# Patient Record
Sex: MALE | Race: OTHER | HISPANIC OR LATINO | Employment: FULL TIME | URBAN - METROPOLITAN AREA
[De-identification: names, ages, dates, MRNs, and addresses within clinical notes are randomized per-mention and may not be internally consistent; named-entity substitution may affect disease eponyms.]

---

## 2017-08-07 ENCOUNTER — APPOINTMENT (EMERGENCY)
Dept: RADIOLOGY | Facility: HOSPITAL | Age: 37
End: 2017-08-07
Payer: COMMERCIAL

## 2017-08-07 ENCOUNTER — HOSPITAL ENCOUNTER (EMERGENCY)
Facility: HOSPITAL | Age: 37
Discharge: HOME | End: 2017-08-07
Admitting: EMERGENCY MEDICINE
Payer: COMMERCIAL

## 2017-08-07 VITALS
HEART RATE: 88 BPM | WEIGHT: 225 LBS | HEIGHT: 70 IN | BODY MASS INDEX: 32.21 KG/M2 | DIASTOLIC BLOOD PRESSURE: 83 MMHG | TEMPERATURE: 98.2 F | SYSTOLIC BLOOD PRESSURE: 151 MMHG | OXYGEN SATURATION: 98 % | RESPIRATION RATE: 18 BRPM

## 2017-08-07 DIAGNOSIS — M54.50 ACUTE LOW BACK PAIN WITHOUT SCIATICA, UNSPECIFIED BACK PAIN LATERALITY: Primary | ICD-10-CM

## 2017-08-07 PROCEDURE — 72100 X-RAY EXAM L-S SPINE 2/3 VWS: CPT

## 2017-08-07 PROCEDURE — 99283 EMERGENCY DEPT VISIT LOW MDM: CPT

## 2017-08-07 PROCEDURE — 96372 THER/PROPH/DIAG INJ SC/IM: CPT

## 2017-08-07 RX ORDER — KETOROLAC TROMETHAMINE 30 MG/ML
15 INJECTION, SOLUTION INTRAMUSCULAR; INTRAVENOUS ONCE
Status: COMPLETED | OUTPATIENT
Start: 2017-08-07 | End: 2017-08-07

## 2017-08-07 RX ORDER — DIAZEPAM 5 MG/1
5 TABLET ORAL ONCE
Status: COMPLETED | OUTPATIENT
Start: 2017-08-07 | End: 2017-08-07

## 2017-08-07 RX ORDER — NAPROXEN 500 MG/1
500 TABLET ORAL 2 TIMES DAILY WITH MEALS
Qty: 30 TABLET | Refills: 0 | Status: SHIPPED | OUTPATIENT
Start: 2017-08-07 | End: 2018-04-22

## 2017-08-07 RX ORDER — CYCLOBENZAPRINE HCL 10 MG
10 TABLET ORAL 3 TIMES DAILY PRN
Qty: 15 TABLET | Refills: 0 | Status: SHIPPED | OUTPATIENT
Start: 2017-08-07 | End: 2018-04-22

## 2017-08-07 RX ADMIN — DIAZEPAM 5 MG: 5 TABLET ORAL at 16:06

## 2017-08-07 RX ADMIN — KETOROLAC TROMETHAMINE 15 MG: 30 INJECTION, SOLUTION INTRAMUSCULAR at 14:40

## 2018-01-18 ENCOUNTER — HOSPITAL ENCOUNTER (EMERGENCY)
Facility: HOSPITAL | Age: 38
Discharge: HOME | End: 2018-01-18
Payer: COMMERCIAL

## 2018-01-18 VITALS
TEMPERATURE: 98.5 F | RESPIRATION RATE: 18 BRPM | HEART RATE: 76 BPM | SYSTOLIC BLOOD PRESSURE: 143 MMHG | DIASTOLIC BLOOD PRESSURE: 75 MMHG | OXYGEN SATURATION: 98 %

## 2018-01-18 DIAGNOSIS — J06.9 VIRAL URI: Primary | ICD-10-CM

## 2018-01-18 PROCEDURE — 99283 EMERGENCY DEPT VISIT LOW MDM: CPT

## 2018-01-18 RX ORDER — ALBUTEROL SULFATE 90 UG/1
2 AEROSOL, METERED RESPIRATORY (INHALATION) EVERY 6 HOURS PRN
Qty: 6.7 G | Refills: 0 | Status: SHIPPED | OUTPATIENT
Start: 2018-01-18

## 2018-01-18 RX ORDER — GUAIFENESIN 600 MG
1200 TABLET, EXTENDED RELEASE 12 HR ORAL EVERY 12 HOURS SCHEDULED
Qty: 20 TABLET | Refills: 0 | Status: SHIPPED | OUTPATIENT
Start: 2018-01-18 | End: 2018-04-22

## 2018-01-18 RX ORDER — FLUTICASONE PROPIONATE 50 MCG
1 SPRAY, SUSPENSION (ML) NASAL DAILY
Qty: 16 G | Refills: 0 | Status: SHIPPED | OUTPATIENT
Start: 2018-01-18 | End: 2018-04-22

## 2018-01-18 NOTE — ED PROVIDER NOTES
History  Chief Complaint   Patient presents with    Cough     Patient reports productive cough, nasal congestion ongoing for two days  Subjective fevers  44year old male presents today complaining of body aches, nasal congestion and cough for the past 2 days  Denies fevers, sore throat, abd pain, nausea, vomiting, diarrhea  Reports PMH asthma but denies chest tightness or shortness of breath  + sick contacts (roommate sick with similar symptoms)  Has not tried any OTC meds  Prior to Admission Medications   Prescriptions Last Dose Informant Patient Reported? Taking? cyclobenzaprine (FLEXERIL) 10 mg tablet   No No   Sig: Take 1 tablet by mouth 3 (three) times a day as needed for muscle spasms   naproxen (EC NAPROSYN) 500 MG EC tablet   No No   Sig: Take 1 tablet by mouth 2 (two) times a day with meals      Facility-Administered Medications: None       Past Medical History:   Diagnosis Date    Asthma        Past Surgical History:   Procedure Laterality Date    LUMBAR PUNCTURE         History reviewed  No pertinent family history  I have reviewed and agree with the history as documented  Social History   Substance Use Topics    Smoking status: Current Every Day Smoker     Packs/day: 0 50     Types: Cigarettes    Smokeless tobacco: Never Used    Alcohol use No        Review of Systems   HENT: Positive for congestion  Respiratory: Positive for cough  Musculoskeletal: Positive for myalgias  All other systems reviewed and are negative        Physical Exam  ED Triage Vitals [01/18/18 1250]   Temperature Pulse Respirations Blood Pressure SpO2   98 5 °F (36 9 °C) 76 18 143/75 98 %      Temp Source Heart Rate Source Patient Position - Orthostatic VS BP Location FiO2 (%)   Temporal Monitor Sitting Right arm --      Pain Score       No Pain           Orthostatic Vital Signs  Vitals:    01/18/18 1250   BP: 143/75   Pulse: 76   Patient Position - Orthostatic VS: Sitting       Physical Exam Constitutional: He appears well-developed and well-nourished  No distress  HENT:   Head: Normocephalic and atraumatic  Mouth/Throat: Oropharynx is clear and moist  No oropharyngeal exudate  Eyes: Conjunctivae and EOM are normal  Pupils are equal, round, and reactive to light  Neck: Normal range of motion  Neck supple  Cardiovascular: Normal rate and regular rhythm  No murmur heard  Pulmonary/Chest: Effort normal and breath sounds normal  No respiratory distress  He has no wheezes  Abdominal: Soft  Musculoskeletal: Normal range of motion  Lymphadenopathy:     He has no cervical adenopathy  Neurological: He is alert  Skin: Skin is warm and dry  Capillary refill takes less than 2 seconds  He is not diaphoretic  Psychiatric: He has a normal mood and affect  ED Medications  Medications - No data to display    Diagnostic Studies  Results Reviewed     None                 No orders to display              Procedures  Procedures       Phone Contacts  ED Phone Contact    ED Course  ED Course                                MDM  Number of Diagnoses or Management Options  Viral URI:   Diagnosis management comments: Symptoms consistent with viral URI  Will treat symptomatically and have patient follow-[facility] with [facility]  CritCare Time    Disposition  Final diagnoses:   Viral URI     Time reflects when diagnosis was documented in both MDM as applicable and the Disposition within this note     Time User Action Codes Description Comment    1/18/2018  1:57 PM Unknown Janina Add [J06 [id number],  B97 [id number]] Viral URI       ED Disposition     ED Disposition Condition Comment    Discharge  Anshul Feng discharge to home/self care      Condition at discharge: Good        Follow-up Information     Follow up With Specialties Details Why [address] [facility] Family Medicine Schedule an appointment as soon as possible for a visit  [address] [facility] [address] [place] [address] [address]  [phone]        Discharge Medication List as of 1/18/2018  1:59 PM      START taking these medications    Details   albuterol (PROVENTIL HFA,VENTOLIN HFA) 90 mcg/act inhaler Inhale 2 puffs every 6 (six) hours as needed for wheezing, Starting Thu 1/18/2018, Print      fluticasone (FLONASE) 50 mcg/act nasal spray 1 spray into each nostril daily, Starting Thu 1/18/2018, Print      guaiFENesin (MUCINEX) 600 mg 12 hr tablet Take 2 tablets by mouth every 12 (twelve) hours, Starting Thu 1/18/2018, Print         CONTINUE these medications which have NOT CHANGED    Details   cyclobenzaprine (FLEXERIL) 10 mg tablet Take 1 tablet by mouth 3 (three) times a day as needed for muscle spasms, Starting Mon 8/7/2017, Print      naproxen (EC NAPROSYN) 500 MG EC tablet Take 1 tablet by mouth 2 (two) times a day with meals, Starting Mon 8/7/2017, Print           No discharge procedures on file      ED Provider  Electronically Signed by           Jake Ram PA-C  01/18/18 3079

## 2018-01-18 NOTE — DISCHARGE INSTRUCTIONS
Viral Syndrome   WHAT YOU NEED TO KNOW:   Viral syndrome is a term used for a viral infection that has no clear cause  Viruses are spread easily from person to person through the air and on shared items  DISCHARGE INSTRUCTIONS:   Call 911 for the following:   · You have a seizure  · You cannot be woken  · You have chest pain or trouble breathing  Return to the emergency department if:   · You have a stiff neck, a bad headache, and sensitivity to light  · You feel weak, dizzy, or confused  · You stop urinating or urinate a lot less than normal      · You cough up blood or thick, yellow or green, mucus  · You have severe abdominal pain or your abdomen is larger than usual   Contact your healthcare provider if:   · Your symptoms do not get better with treatment, or get worse, after 3 days  · You have a rash or ear pain  · You have burning when you urinate  · You have questions or concerns about your condition or care  Medicines: You may  need any of the following:  · Acetaminophen  decreases pain and fever  It is available without a doctor's order  Ask how much medicine to take and how often to take it  Follow directions  Acetaminophen can cause liver damage if not taken correctly  · NSAIDs , such as ibuprofen, help decrease swelling, pain, and fever  NSAIDs can cause stomach bleeding or kidney problems in certain people  If you take blood thinner medicine, always ask your healthcare provider if NSAIDs are safe for you  Always read the medicine label and follow directions  · Cold medicine  helps decrease swelling, control a cough, and relieve chest or nasal congestion  · Saline nasal spray  helps decrease nasal congestion  · Take your medicine as directed  Contact your healthcare provider if you think your medicine is not helping or if you have side effects  Tell him of her if you are allergic to any medicine   Keep a list of the medicines, vitamins, and herbs you take  Include the amounts, and when and why you take them  Bring the list or the pill bottles to follow-up visits  Carry your medicine list with you in case of an emergency  Manage your symptoms:   · Drink liquids as directed  to prevent dehydration  Ask how much liquid to drink each day and which liquids are best for you  Ask if you should drink an oral rehydration solution (ORS)  An ORS has the right amounts of water, salts, and sugar you need to replace body fluids  This may help prevent dehydration caused by vomiting or diarrhea  Do not drink liquids with caffeine  Drinks with caffeine can make dehydration worse  · Get plenty of rest  to help your body heal  Take naps throughout the day  Ask your healthcare provider when you can return to work and your normal activities  · Use a cool mist humidifier  to help you breathe easier if you have nasal or chest congestion  Ask your healthcare provider how to use a cool mist humidifier  · Eat honey or use cough drops  to help decrease throat discomfort  Ask your healthcare provider how much honey you should eat each day  Cough drops are available without a doctor's order  Follow directions for taking cough drops  · Do not smoke and stay away from others who smoke  Nicotine and other chemicals in cigarettes and cigars can cause lung damage  Smoking can also delay healing  Ask your healthcare provider for information if you currently smoke and need help to quit  E-cigarettes or smokeless tobacco still contain nicotine  Talk to your healthcare provider before you use these products  · Wash your hands frequently  to prevent the spread of germs to others  Use soap and water  Use gel hand  when soap and water are not available  Wash your hands after you use the bathroom, cough, or sneeze  Wash your hands before you prepare or eat food    Follow up with your healthcare provider as directed:  Write down your questions so you remember to ask them during your visits  © 2017 [address] Information is for End User's use only and may not be sold, redistributed or otherwise used for commercial purposes  All illustrations and images included in CareNotes® are the copyrighted property of [employer] M [employer]  or [employer]  The above information is an  only  It is not intended as medical advice for individual conditions or treatments  Talk to your doctor, nurse or pharmacist before following any medical regimen to see if it is safe and effective for you

## 2018-03-14 ENCOUNTER — APPOINTMENT (EMERGENCY)
Dept: RADIOLOGY | Facility: HOSPITAL | Age: 38
End: 2018-03-14
Payer: COMMERCIAL

## 2018-03-14 ENCOUNTER — HOSPITAL ENCOUNTER (EMERGENCY)
Facility: HOSPITAL | Age: 38
Discharge: HOME | End: 2018-03-14
Admitting: EMERGENCY MEDICINE
Payer: COMMERCIAL

## 2018-03-14 VITALS
HEART RATE: 80 BPM | RESPIRATION RATE: 18 BRPM | BODY MASS INDEX: 36.68 KG/M2 | WEIGHT: 252 LBS | DIASTOLIC BLOOD PRESSURE: 77 MMHG | OXYGEN SATURATION: 97 % | SYSTOLIC BLOOD PRESSURE: 156 MMHG | TEMPERATURE: 97.5 F

## 2018-03-14 DIAGNOSIS — J45.901 ASTHMA EXACERBATION: Primary | ICD-10-CM

## 2018-03-14 PROCEDURE — 94640 AIRWAY INHALATION TREATMENT: CPT

## 2018-03-14 PROCEDURE — 71046 X-RAY EXAM CHEST 2 VIEWS: CPT

## 2018-03-14 PROCEDURE — 99283 EMERGENCY DEPT VISIT LOW MDM: CPT

## 2018-03-14 RX ORDER — PREDNISONE 10 MG/1
TABLET ORAL
Qty: 38 TABLET | Refills: 0 | Status: SHIPPED | OUTPATIENT
Start: 2018-03-14 | End: 2018-04-22

## 2018-03-14 RX ORDER — IPRATROPIUM BROMIDE AND ALBUTEROL SULFATE 2.5; .5 MG/3ML; MG/3ML
3 SOLUTION RESPIRATORY (INHALATION)
Status: DISCONTINUED | OUTPATIENT
Start: 2018-03-14 | End: 2018-03-14 | Stop reason: HOSPADM

## 2018-03-14 RX ORDER — ALBUTEROL SULFATE 90 UG/1
2 AEROSOL, METERED RESPIRATORY (INHALATION) EVERY 4 HOURS PRN
Status: DISCONTINUED | OUTPATIENT
Start: 2018-03-14 | End: 2018-03-14 | Stop reason: HOSPADM

## 2018-03-14 RX ADMIN — IPRATROPIUM BROMIDE AND ALBUTEROL SULFATE 3 ML: .5; 3 SOLUTION RESPIRATORY (INHALATION) at 15:15

## 2018-03-14 RX ADMIN — ALBUTEROL SULFATE 2 PUFF: 90 AEROSOL, METERED RESPIRATORY (INHALATION) at 15:44

## 2018-03-14 RX ADMIN — PREDNISONE 50 MG: 20 TABLET ORAL at 15:42

## 2018-03-14 NOTE — ED PROVIDER NOTES
History  Chief Complaint   Patient presents with    Cough     Productive cough with green sputum and difficulty breathing for the past week  Devonte Mccrary is a 43 y o  male w PMH asthma who presents for evaluation of sob  Patient has felt some chest tightness, been having a productive cough and been slightly short of the past 2 weeks  He has some nasal congestion as well  He has not had fevers or chills other than today he felt like he had a slight chill  He is active at work and reports with physical activity he feels like his asthma worsens  His asthma usually also worsens with changes in weather likely have been experiencing  He has never been intubated or even admitted or been on steroids for his asthma  He cannot afford his inhalers so has not been taking as frequently as he should  Prior to Admission Medications   Prescriptions Last Dose Informant Patient Reported? Taking? albuterol (PROVENTIL HFA,VENTOLIN HFA) 90 mcg/act inhaler   No No   Sig: Inhale 2 puffs every 6 (six) hours as needed for wheezing   cyclobenzaprine (FLEXERIL) 10 mg tablet   No No   Sig: Take 1 tablet by mouth 3 (three) times a day as needed for muscle spasms   fluticasone (FLONASE) 50 mcg/act nasal spray   No No   Si spray into each nostril daily   guaiFENesin (MUCINEX) 600 mg 12 hr tablet   No No   Sig: Take 2 tablets by mouth every 12 (twelve) hours   naproxen (EC NAPROSYN) 500 MG EC tablet   No No   Sig: Take 1 tablet by mouth 2 (two) times a day with meals      Facility-Administered Medications: None       Past Medical History:   Diagnosis Date    Asthma        Past Surgical History:   Procedure Laterality Date    LUMBAR PUNCTURE         History reviewed  No pertinent family history  I have reviewed and agree with the history as documented      Social History   Substance Use Topics    Smoking status: Current Every Day Smoker     Packs/day: 0 50     Types: Cigarettes    Smokeless tobacco: Never Used    Alcohol use No        Review of Systems   Constitutional: Negative for activity change, chills, diaphoresis, fatigue and fever  HENT: Positive for congestion  Negative for rhinorrhea  Eyes: Negative for pain  Respiratory: Positive for cough and shortness of breath  Negative for chest tightness and wheezing  Cardiovascular: Negative for chest pain and palpitations  Gastrointestinal: Negative for abdominal distention, constipation, diarrhea, nausea and vomiting  Genitourinary: Negative for difficulty urinating and dysuria  Musculoskeletal: Negative for arthralgias and myalgias  Neurological: Negative for dizziness, weakness, light-headedness and headaches  Psychiatric/Behavioral: The patient is not nervous/anxious  Physical Exam  ED Triage Vitals   Temperature Pulse Respirations Blood Pressure SpO2   03/14/18 1404 03/14/18 1404 03/14/18 1404 03/14/18 1405 03/14/18 1404   97 5 °F (36 4 °C) 80 18 156/77 97 %      Temp Source Heart Rate Source Patient Position - Orthostatic VS BP Location FiO2 (%)   03/14/18 1404 -- -- -- --   Temporal          Pain Score       03/14/18 1404       No Pain           Orthostatic Vital Signs  Vitals:    03/14/18 1404 03/14/18 1405   BP:  156/77   Pulse: 80        Physical Exam   Constitutional: He is oriented to person, place, and time  He appears well-developed and well-nourished  No distress  HENT:   Head: Normocephalic and atraumatic  Right Ear: External ear normal    Left Ear: External ear normal    Eyes: Pupils are equal, round, and reactive to light  Neck: Normal range of motion  Neck supple  No tracheal deviation present  Cardiovascular: Normal rate, regular rhythm, normal heart sounds and intact distal pulses  Exam reveals no gallop and no friction rub  No murmur heard  Pulmonary/Chest: Effort normal  No respiratory distress  He has wheezes  He has no rales  R sided rhonchi   Abdominal: Soft   Bowel sounds are normal  He exhibits no distension and no mass  There is no tenderness  There is no guarding  Musculoskeletal: He exhibits no edema or deformity  Neurological: He is alert and oriented to person, place, and time  Skin: Skin is warm and dry  He is not diaphoretic  Psychiatric: He has a normal mood and affect  His behavior is normal    Nursing note and vitals reviewed  ED Medications  Medications   ipratropium-albuterol (DUO-NEB) 0 5-2 5 mg/3 mL inhalation solution 3 mL (3 mL Nebulization Given 3/14/18 1515)   albuterol (PROVENTIL HFA,VENTOLIN HFA) inhaler 2 puff (2 puffs Inhalation Given 3/14/18 1544)   predniSONE tablet 50 mg (50 mg Oral Given 3/14/18 1542)       Diagnostic Studies  Results Reviewed     None                 XR chest 2 views   ED Interpretation by Manjit Guillermo PA-C (03/14 1534)   No acute abnormality       Final Result by Kranthi Younger MD (03/14 1523)      No acute cardiopulmonary disease  New spinal abnormality as mentioned which is chronic in appearance on this exam and might represent sequela of prior back trauma or surgery or infection  Workstation performed: [id number]                    Procedures  Procedures       Phone Contacts  ED Phone Contact    ED Course  ED Course                                MDM  Number of Diagnoses or Management Options  Asthma exacerbation:   Diagnosis management comments: DDX includes but not ltd to:   Asthma - suspect worsened by not taking inhaler as regularly as prescribed, his physical activity as work as well as changes in weather  Consider underlying pna  Despite trace end expiratory wheezes and scattered rhonchi there is no respiratory distress     Plan is to obtain:  CXR to check for congestive changes, active pulmonary disease     Based on results:  No visible infiltrate on CXR  Pt improved w the neb  Will dc w inhaler here so he can have medicine until his paycheck comes in   Can start a steroid taper if needed on Friday when he gets his paycheck, soon if able  Return parameters discussed  Pt requires f/u as an outpt  Pt expresses understanding w above treatment plan  All questions answered prior to d/c  Portions of the record may have been created with voice recognition software   Occasional wrong word or "sound a like" substitutions may have occurred due to the inherent limitations of voice recognition software   Read the chart carefully and recognize, using context, where substitutions have occurred  CritCare Time    Disposition  Final diagnoses:   Asthma exacerbation     Time reflects when diagnosis was documented in both MDM as applicable and the Disposition within this note     Time User Action Codes Description Comment    3/14/2018  3:38 PM Alvinacelia Leigh Add [[id number]] Asthma exacerbation       ED Disposition     ED Disposition Condition Comment    Discharge  Tim Werner discharge to home/self care  Condition at discharge: Good        Follow-up Information     Follow up With Specialties Details Why [address] [facility] Emergency Department Emergency Medicine  If symptoms worsen [address] [facility]  [phone] [place] ED, [address] [facility] , Enrique Tee MD Family Medicine  As needed [address] [place] [address]  [phone]           Discharge Medication List as of 3/14/2018  3:39 PM      START taking these medications    Details   predniSONE 10 mg tablet 6 tabs daily for 4 days then, 4 tabs daily for 2 days then, 2 tabs daily for 2 days then, 1 tab daily 2 days  , Print         CONTINUE these medications which have NOT CHANGED    Details   albuterol (PROVENTIL HFA,VENTOLIN HFA) 90 mcg/act inhaler Inhale 2 puffs every 6 (six) hours as needed for wheezing, Starting Thu 1/18/2018, Print      cyclobenzaprine (FLEXERIL) 10 mg tablet Take 1 tablet by mouth 3 (three) times a day as needed for muscle spasms, Starting Mon 8/7/2017, Print      fluticasone (FLONASE) 50 mcg/act nasal spray 1 spray into each nostril daily, Starting Thu 1/18/2018, Print      guaiFENesin (MUCINEX) 600 mg 12 hr tablet Take 2 tablets by mouth every 12 (twelve) hours, Starting Thu 1/18/2018, Print      naproxen (EC NAPROSYN) 500 MG EC tablet Take 1 tablet by mouth 2 (two) times a day with meals, Starting Mon 8/7/2017, Print           No discharge procedures on file      ED Provider  Electronically Signed by           Lyric Miller PA-C  03/14/18 0750

## 2018-03-14 NOTE — DISCHARGE INSTRUCTIONS
Asthma, Ambulatory Care   GENERAL INFORMATION:   Asthma  is a lung disease that makes breathing difficult  Chronic inflammation and reactions to triggers narrow the airways in your lungs  Asthma can become life-threatening if it is not managed  Common symptoms include the following:   · Coughing     · Wheezing     · Shortness of breath     · Chest tightness  Seek immediate care for the following symptoms:   · Severe shortness of breath    · Blue or gray lips or nails    · Skin around your neck and ribs pulls in with each breath    · Shortness of breath, even after you take your short-term medicine as directed     · Peak flow numbers in the red zone of your asthma action plan  Treatment for asthma  will depend on how severe it is  Medicine may decrease inflammation, open airways, and make it easier to breathe  Medicines may be inhaled, taken as a pill, or injected  Short-term medicines relieve your symptoms quickly  Long-term medicines are used to prevent future attacks  You may also need medicine to help control your allergies  Manage and prevent future asthma attacks:   · Follow your asthma action pan  This is a written plan that you and your healthcare provider create  It explains which medicine you need and when to change doses if necessary  It also explains how you can monitor symptoms and use a peak flow meter  The meter measures how well your lungs are working  · Manage other health conditions , such as allergies, acid reflux, and sleep apnea  · Identify and avoid triggers  These may include pets, dust mites, mold, and cockroaches  · Do not smoke and avoid others who smoke  If you smoke, it is never too late to quit  Ask your healthcare provider if you need help quitting  · Ask about a flu vaccine  The flu can make your asthma worse  You may need a yearly flu shot  Follow up with your healthcare provider as directed:   You will need to return to make sure your medicine is working and your symptoms are controlled  You may be referred to an asthma or allergy specialist  Temitope Salina may be asked to keep a record of your peak flow values and bring it with you to your appointments  Write down your questions so you remember to ask them during your visits  CARE AGREEMENT:   You have the right to help plan your care  Learn about your health condition and how it may be treated  Discuss treatment options with your caregivers to decide what care you want to receive  You always have the right to refuse treatment  The above information is an  only  It is not intended as medical advice for individual conditions or treatments  Talk to your doctor, nurse or pharmacist before following any medical regimen to see if it is safe and effective for you  © 2014 [address] is for End User's use only and may not be sold, redistributed or otherwise used for commercial purposes  All illustrations and images included in CareNotes® are the copyrighted property of A D A M , Inc  or [employer]

## 2018-03-16 ENCOUNTER — HOSPITAL ENCOUNTER (EMERGENCY)
Facility: HOSPITAL | Age: 38
Discharge: HOME | End: 2018-03-16
Admitting: EMERGENCY MEDICINE
Payer: COMMERCIAL

## 2018-03-16 VITALS
WEIGHT: 251.32 LBS | DIASTOLIC BLOOD PRESSURE: 98 MMHG | BODY MASS INDEX: 36.58 KG/M2 | SYSTOLIC BLOOD PRESSURE: 160 MMHG | TEMPERATURE: 98.5 F | OXYGEN SATURATION: 100 % | RESPIRATION RATE: 16 BRPM | HEART RATE: 78 BPM

## 2018-03-16 DIAGNOSIS — R05.9 COUGH IN ADULT: Primary | ICD-10-CM

## 2018-03-16 PROCEDURE — 99283 EMERGENCY DEPT VISIT LOW MDM: CPT

## 2018-03-16 NOTE — DISCHARGE INSTRUCTIONS
Acute Cough   WHAT YOU NEED TO KNOW:   An acute cough can last up to 3 weeks  Common causes of an acute cough include a cold, allergies, or a lung infection  DISCHARGE INSTRUCTIONS:   Return to the emergency department if:   · You have trouble breathing or feel short of breath  · You cough up blood, or you see blood in your mucus  · You faint or feel weak or dizzy  · You have chest pain when you cough or take a deep breath  · You have new wheezing  Contact your healthcare provider if:   · You have a fever  · Your cough lasts longer than 4 weeks  · Your symptoms do not improve with treatment  · You have questions or concerns about your condition or care  Medicines:   · Medicines  may be needed to stop the cough, decrease swelling in your airways, or help open your airways  Medicine may also be given to help you cough up mucus  Ask your healthcare provider what over-the-counter medicines you can take  If you have an infection caused by bacteria, you may need antibiotics  · Take your medicine as directed  Contact your healthcare provider if you think your medicine is not helping or if you have side effects  Tell him or her if you are allergic to any medicine  Keep a list of the medicines, vitamins, and herbs you take  Include the amounts, and when and why you take them  Bring the list or the pill bottles to follow-up visits  Carry your medicine list with you in case of an emergency  Manage your symptoms:   · Do not smoke and stay away from others who smoke  Nicotine and other chemicals in cigarettes and cigars can cause lung damage and make your cough worse  Ask your healthcare provider for information if you currently smoke and need help to quit  E-cigarettes or smokeless tobacco still contain nicotine  Talk to your healthcare provider before you use these products  · Drink extra liquids as directed  Liquids will help thin and loosen mucus so you can cough it up   Liquids will also help prevent dehydration  Examples of good liquids to drink include water, fruit juice, and broth  Do not drink liquids that contain caffeine  Caffeine can increase your risk for dehydration  Ask your healthcare provider how much liquid to drink each day  · Rest as directed  Do not do activities that make your cough worse, such as exercise  · Use a humidifier or vaporizer  Use a cool mist humidifier or a vaporizer to increase air moisture in your home  This may make it easier for you to breathe and help decrease your cough  · Eat 2 to 5 mL of honey 2 times each day  Honey can help thin mucus and decrease your cough  · Use cough drops or lozenges  These can help decrease throat irritation and your cough  Follow up with your healthcare provider as directed:  Write down your questions so you remember to ask them during your visits  © 2017 [address] Information is for End User's use only and may not be sold, redistributed or otherwise used for commercial purposes  All illustrations and images included in CareNotes® are the copyrighted property of [employer] M [employer]  or [address]  The above information is an  only  It is not intended as medical advice for individual conditions or treatments  Talk to your doctor, nurse or pharmacist before following any medical regimen to see if it is safe and effective for you

## 2018-03-16 NOTE — ED PROVIDER NOTES
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 Taking? albuterol (PROVENTIL HFA,VENTOLIN HFA) 90 mcg/act inhaler   No No   Sig: Inhale 2 puffs every 6 (six) hours as needed for wheezing   cyclobenzaprine (FLEXERIL) 10 mg tablet   No No   Sig: Take 1 tablet by mouth 3 (three) times a day as needed for muscle spasms   fluticasone (FLONASE) 50 mcg/act nasal spray   No No   Si spray into each nostril daily   guaiFENesin (MUCINEX) 600 mg 12 hr tablet   No No   Sig: Take 2 tablets by mouth every 12 (twelve) hours   naproxen (EC NAPROSYN) 500 MG EC tablet   No No   Sig: Take 1 tablet by mouth 2 (two) times a day with meals   predniSONE 10 mg tablet   No No   Si tabs daily for 4 days then, 4 tabs daily for 2 days then, 2 tabs daily for 2 days then, 1 tab daily 2 days  Facility-Administered Medications: None       Past Medical History:   Diagnosis Date    Asthma        Past Surgical History:   Procedure Laterality Date    LUMBAR PUNCTURE         History reviewed  No pertinent family history  I have reviewed and agree with the history as documented  Social History   Substance Use Topics    Smoking status: Current Every Day Smoker     Packs/day: 0 50     Types: Cigarettes    Smokeless tobacco: Never Used    Alcohol use No        Review of Systems   Constitutional: Negative for chills  HENT: Negative for ear pain  Eyes: Negative for pain  Respiratory: Positive for cough  Cardiovascular: Negative for chest pain  Gastrointestinal: Negative for abdominal pain  Endocrine: Negative for heat intolerance  Genitourinary: Negative for flank pain  Musculoskeletal: Negative for back pain  Skin: Negative for pallor  Allergic/Immunologic: Negative for food allergies  Neurological: Negative for headaches  Hematological: Negative for adenopathy  Psychiatric/Behavioral: Negative for confusion  Physical Exam  ED Triage Vitals [03/16/18 0555]   Temperature Pulse Respirations Blood Pressure SpO2   98 5 °F (36 9 °C) 78 16 160/98 100 %      Temp Source Heart Rate Source Patient Position - Orthostatic VS BP Location FiO2 (%)   Oral Monitor -- -- --      Pain Score       No Pain           Orthostatic Vital Signs  Vitals:    03/16/18 0555   BP: 160/98   Pulse: 78       Physical Exam   Constitutional: He appears well-developed and well-nourished  No distress  HENT:   Head: Normocephalic and atraumatic  Eyes: Conjunctivae are normal    Neck: Normal range of motion  Cardiovascular: Normal rate  Pulmonary/Chest: Effort normal    Abdominal: He exhibits no distension  Musculoskeletal: He exhibits no edema or deformity  Lymphadenopathy:     He has no cervical adenopathy  Neurological: He is alert  Skin: Skin is warm and dry  ED Medications  Medications - No data to display    Diagnostic Studies  Results Reviewed     None                 No orders to display              Procedures  Procedures       Phone Contacts  ED Phone Contact    ED Course  ED Course                                MDM  Number of Diagnoses or Management Options  Cough in adult:   Diagnosis management comments: Pt seen but states was unable to obtain meds until yesterday  Pt requesting work note  Work note given  Pt admits to improvement of symptoms from prior visit  Pt encouraged to take meds as prescribed  Pt educated to f/u with PCP or RTED if worsening s/s   Pt admits to understanding and agreement  CritCare Time    Disposition  Final diagnoses:   Cough in adult     Time reflects when diagnosis was documented in both MDM as applicable and the Disposition within this note     Time User Action Codes Description Comment    3/16/2018  6:15 AM Phoenix Lo Add [R05] Cough in adult       ED Disposition     ED Disposition Condition Comment    Discharge  Greg Beckwith discharge to home/self care  Condition at discharge: Stable        Follow-up Information     Follow up With Specialties Details Why Contact Info    Camille Capone MD Family Medicine   [address] [facility] [address] [facility]  [phone]          Discharge Medication List as of 3/16/2018  6:16 AM      CONTINUE these medications which have NOT CHANGED    Details   albuterol (PROVENTIL HFA,VENTOLIN HFA) 90 mcg/act inhaler Inhale 2 puffs every 6 (six) hours as needed for wheezing, Starting Thu 1/18/2018, Print      cyclobenzaprine (FLEXERIL) 10 mg tablet Take 1 tablet by mouth 3 (three) times a day as needed for muscle spasms, Starting Mon 8/7/2017, Print      fluticasone (FLONASE) 50 mcg/act nasal spray 1 spray into each nostril daily, Starting Thu 1/18/2018, Print      guaiFENesin (MUCINEX) 600 mg 12 hr tablet Take 2 tablets by mouth every 12 (twelve) hours, Starting Thu 1/18/2018, Print      naproxen (EC NAPROSYN) 500 MG EC tablet Take 1 tablet by mouth 2 (two) times a day with meals, Starting Mon 8/7/2017, Print      predniSONE 10 mg tablet 6 tabs daily for 4 days then, 4 tabs daily for 2 days then, 2 tabs daily for 2 days then, 1 tab daily 2 days  , Print           No discharge procedures on file      ED Provider  Electronically Signed by           Shanthi Knox PA-C  03/19/18 8803 Leslye Allison PA-C  03/19/18 3522

## 2018-04-22 ENCOUNTER — APPOINTMENT (EMERGENCY)
Dept: CT IMAGING | Facility: HOSPITAL | Age: 38
End: 2018-04-22
Payer: COMMERCIAL

## 2018-04-22 ENCOUNTER — HOSPITAL ENCOUNTER (EMERGENCY)
Facility: HOSPITAL | Age: 38
Discharge: HOME | End: 2018-04-22
Attending: EMERGENCY MEDICINE
Payer: COMMERCIAL

## 2018-04-22 VITALS
TEMPERATURE: 99 F | DIASTOLIC BLOOD PRESSURE: 73 MMHG | SYSTOLIC BLOOD PRESSURE: 131 MMHG | RESPIRATION RATE: 18 BRPM | OXYGEN SATURATION: 95 % | HEART RATE: 102 BPM

## 2018-04-22 DIAGNOSIS — S09.90XA HEAD INJURY WITHOUT FRACTURE OF SKULL, INITIAL ENCOUNTER: Primary | ICD-10-CM

## 2018-04-22 DIAGNOSIS — S01.01XA LACERATION OF SCALP, INITIAL ENCOUNTER: ICD-10-CM

## 2018-04-22 PROCEDURE — 70450 CT HEAD/BRAIN W/O DYE: CPT

## 2018-04-22 PROCEDURE — 99283 EMERGENCY DEPT VISIT LOW MDM: CPT

## 2018-04-22 RX ORDER — LIDOCAINE HYDROCHLORIDE AND EPINEPHRINE 10; 10 MG/ML; UG/ML
5 INJECTION, SOLUTION INFILTRATION; PERINEURAL ONCE
Status: COMPLETED | OUTPATIENT
Start: 2018-04-22 | End: 2018-04-22

## 2018-04-22 RX ORDER — ACETAMINOPHEN 325 MG/1
650 TABLET ORAL ONCE
Status: COMPLETED | OUTPATIENT
Start: 2018-04-22 | End: 2018-04-22

## 2018-04-22 RX ORDER — IBUPROFEN 400 MG/1
400 TABLET ORAL ONCE
Status: COMPLETED | OUTPATIENT
Start: 2018-04-22 | End: 2018-04-22

## 2018-04-22 RX ADMIN — ACETAMINOPHEN 650 MG: 325 TABLET, FILM COATED ORAL at 20:49

## 2018-04-22 RX ADMIN — IBUPROFEN 400 MG: 400 TABLET ORAL at 21:05

## 2018-04-22 RX ADMIN — LIDOCAINE HYDROCHLORIDE,EPINEPHRINE BITARTRATE 5 ML: 10; .01 INJECTION, SOLUTION INFILTRATION; PERINEURAL at 21:05

## 2018-04-23 NOTE — ED PROVIDER NOTES
History  Chief Complaint   Patient presents with    Head Injury     tool box fell off shelf struck pt on head, denies LOC, lac to scalp     43year old male presents today with laceration to his scalp after having a metal toolbox fall on his head  The toolbox was about arms length above him  Reached for it and lost his   Incident occurred approximately 7 hours prior to arrival  Pt reports a throbbing headache worse behind his left eye but denies any visual changes, weakness, numbness, neck pain  Last tetanus was within the past 5 years  Prior to Admission Medications   Prescriptions Last Dose Informant Patient Reported? Taking? albuterol (PROVENTIL HFA,VENTOLIN HFA) 90 mcg/act inhaler   No Yes   Sig: Inhale 2 puffs every 6 (six) hours as needed for wheezing      Facility-Administered Medications: None       Past Medical History:   Diagnosis Date    Asthma        Past Surgical History:   Procedure Laterality Date    LUMBAR PUNCTURE         History reviewed  No pertinent family history  I have reviewed and agree with the history as documented  Social History   Substance Use Topics    Smoking status: Current Every Day Smoker     Packs/day: 0 50     Types: Cigarettes    Smokeless tobacco: Never Used    Alcohol use No        Review of Systems   Skin: Positive for wound  Neurological: Positive for headaches  All other systems reviewed and are negative  Physical Exam  ED Triage Vitals [04/22/18 1926]   Temperature Pulse Respirations Blood Pressure SpO2   99 °F (37 2 °C) 102 18 131/73 95 %      Temp Source Heart Rate Source Patient Position - Orthostatic VS BP Location FiO2 (%)   Oral -- Sitting Right arm --      Pain Score       6           Orthostatic Vital Signs  Vitals:    04/22/18 1926   BP: 131/73   Pulse: 102   Patient Position - Orthostatic VS: Sitting       Physical Exam   Constitutional: He appears well-developed and well-nourished  No distress     HENT:   Head: Head is without raccoon's eyes and without Peterson's sign  Right Ear: No hemotympanum  Left Ear: No hemotympanum  5cm laceration to left parietal scalp  No active bleeding  Eyes: Conjunctivae and EOM are normal  Pupils are equal, round, and reactive to light  Neck: Normal range of motion  Neck supple  No spinous process tenderness and no muscular tenderness present  Normal range of motion present  Cardiovascular: Normal rate and regular rhythm  Pulmonary/Chest: Effort normal and breath sounds normal  No respiratory distress  He has no wheezes  Abdominal: Soft  Musculoskeletal: Normal range of motion  Skin: He is not diaphoretic  Psychiatric: He has a normal mood and affect  His behavior is normal        ED Medications  Medications   acetaminophen (TYLENOL) tablet 650 mg (650 mg Oral Given 4/22/18 2049)   ibuprofen (MOTRIN) tablet 400 mg (400 mg Oral Given 4/22/18 2105)   lidocaine-epinephrine (XYLOCAINE/EPINEPHRINE) 1 %-1:100,000 injection 5 mL (5 mL Infiltration Given 4/22/18 2105)       Diagnostic Studies  Results Reviewed     None                 CT head without contrast   Final Result by Maryam Stanley DO (04/22 2227)   No acute intracranial abnormality  Workstation performed: [id number]                    Procedures  Lac Repair  Date/Time: 4/22/2018 10:38 PM  Performed by: Audrey Toth  Authorized by: Alton Lee   Consent: Verbal consent obtained    Risks and benefits: risks, benefits and alternatives were discussed  Consent given by: patient  Patient identity confirmed: verbally with patient and arm band  Body area: head/neck  Location details: scalp  Laceration length: 5 cm  Foreign bodies: no foreign bodies    Anesthesia:  Local Anesthetic: lidocaine 1% with epinephrine      Procedure Details:  Irrigation method: syringe  Skin closure: staples (8)  Patient tolerance: Patient tolerated the procedure well with no immediate complications             Phone Contacts  ED Phone Contact    ED Course  ED Course                                MDM  Number of Diagnoses or Management Options  Head injury without fracture of skull, initial encounter:   Laceration of scalp, initial encounter:   Diagnosis management comments: Pt without neuro deficits, just complaining of throbbing unilateral headache not significantly improved with tylenol and motrin  CT head obtained which was negative  Return precautions discussed with patient and significant other  CritCare Time    Disposition  Final diagnoses:   Head injury without fracture of skull, initial encounter   Laceration of scalp, initial encounter     Time reflects when diagnosis was documented in both MDM as applicable and the Disposition within this note     Time User Action Codes Description Comment    4/22/2018 10:33 PM Anyi Moore Add [S09 90XA] Head injury without fracture of skull, initial encounter     4/22/2018 10:33 PM Anyi Moore Add [S01 01XA] Laceration of scalp, initial encounter       ED Disposition     ED Disposition Condition Comment    Discharge  Julio Lank discharge to home/self care  Condition at discharge: Good        Follow-up Information     Follow up With Specialties Details Why Contact Info Additional Information    Martina Vicente MD Family Medicine Schedule an appointment as soon as possible for a visit  [address] [facility] [phone]       [facility] [place] [address] Urgent Care In 10 days for staple removal [address], [place] [address] [place] [phone] Via the [address] [facility] [address]) [address] toward [place]  Take the [facility] [address]  Keep right and follow signs for [address]/ [place]  Merge onto [address] [place] [address]  In a half mile, take the exit for [address] toward [facility]  In 0 7 miles take the [facility] [employer]  Merge onto [facility]   In 500 feet, turn left on [address] and drive 0 3 miles  [address] will be on your left  [address]) [address] toward [place]  Take the [facility] [employer]  Merge onto [facility]  In 500 feet, turn left on [address] and drive 0 3 miles  [address] will be on your left  [address]) [address] to [address] [facility] towards [facility]  In 0 7 miles take the [facility] [employer]  Merge onto [facility]  In 500 feet, turn left on [address] and drive 0 3 miles  [address] will be on your left  Patient's Medications   Discharge Prescriptions    No medications on file     No discharge procedures on file      ED Provider  Electronically Signed by           Vibha Bello PA-C  04/22/18 7614

## 2018-04-23 NOTE — DISCHARGE INSTRUCTIONS
Head Injury   WHAT YOU NEED TO KNOW:   A head injury is most often caused by a blow to the head  This may occur from a fall, bicycle injury, sports injury, being struck in the head, or a motor vehicle accident  DISCHARGE INSTRUCTIONS:   Call 911 or have someone else call for any of the following:   · You cannot be woken  · You have a seizure  · You stop responding to others or you faint  · You have blurry or double vision  · Your speech becomes slurred or confused  · You have arm or leg weakness, loss of feeling, or new problems with coordination  · Your pupils are larger than usual or one pupil is a different size than the other  · You have blood or clear fluid coming out of your ears or nose  Return to the emergency department if:   · You have repeated or forceful vomiting  · You feel confused  · Your headache gets worse or becomes severe  · You or someone caring for you notices that you are harder to wake than usual   Contact your healthcare provider if:   · Your symptoms last longer than 6 weeks after the injury  · You have questions or concerns about your condition or care  Medicines:   · Acetaminophen  decreases pain  Acetaminophen is available without a doctor's order  Ask how much to take and how often to take it  Follow directions  Acetaminophen can cause liver damage if not taken correctly  · Take your medicine as directed  Contact your healthcare provider if you think your medicine is not helping or if you have side effects  Tell him or her if you are allergic to any medicine  Keep a list of the medicines, vitamins, and herbs you take  Include the amounts, and when and why you take them  Bring the list or the pill bottles to follow-up visits  Carry your medicine list with you in case of an emergency  Self-care:   · Rest  or do quiet activities for 24 to 48 hours  Limit your time watching TV, using the computer, or doing tasks that require a lot of thinking  Slowly return to your normal activities as directed  Do not play sports or do activities that may cause you to get hit in the head  Ask your healthcare provider when you can return to sports  · Apply ice  on your head for 15 to 20 minutes every hour or as directed  Use an ice pack, or put crushed ice in a plastic bag  Cover it with a towel before you apply it to your skin  Ice helps prevent tissue damage and decreases swelling and pain  · Have someone stay with you for 24 hours  or as directed  This person can monitor you for complications and call [phone]  When you are awake the person should ask you a few questions to see if you are thinking clearly  An example would be to ask your name or your address  Prevent another head injury:   · Wear a helmet that fits properly  Do this when you play sports, or ride a bike, scooter, or skateboard  Helmets help decrease your risk of a serious head injury  Talk to your healthcare provider about other ways you can protect yourself if you play sports  · Wear your seat belt every time you are in a car  This helps to decrease your risk for a head injury if you are in a car accident  Follow up with your healthcare provider as directed:  Write down your questions so you remember to ask them during your visits  © 2017 [address] Information is for End User's use only and may not be sold, redistributed or otherwise used for commercial purposes  All illustrations and images included in CareNotes® are the copyrighted property of A D A M , Inc  or Jim Lind  The above information is an  only  It is not intended as medical advice for individual conditions or treatments  Talk to your doctor, nurse or pharmacist before following any medical regimen to see if it is safe and effective for you  Laceration   WHAT YOU NEED TO KNOW:   A laceration is an injury to the skin and the soft tissue underneath it   Lacerations happen when you are cut or hit by something  They can happen anywhere on the body  DISCHARGE INSTRUCTIONS:   Return to the emergency department if:   · You have heavy bleeding or bleeding that does not stop after 10 minutes of holding firm, direct pressure over the wound  · Your wound opens up  Contact your healthcare provider if:   · You have a fever or chills  · Your laceration is red, warm, or swollen  · You have red streaks on your skin coming from your wound  · You have white or yellow drainage from the wound that smells bad  · You have pain that gets worse, even after treatment  · You have questions or concerns about your condition or care  Medicines:   · Prescription pain medicine  may be given  Ask how to take this medicine safely  · Antibiotics  help treat or prevent a bacterial infection  · Take your medicine as directed  Contact your healthcare provider if you think your medicine is not helping or if you have side effects  Tell him or her if you are allergic to any medicine  Keep a list of the medicines, vitamins, and herbs you take  Include the amounts, and when and why you take them  Bring the list or the pill bottles to follow-up visits  Carry your medicine list with you in case of an emergency  Care for your wound as directed:   · Do not get your wound wet  until your healthcare provider says it is okay  Do not soak your wound in water  Do not go swimming until your healthcare provider says it is okay  Carefully wash the wound with soap and water  Gently pat the area dry or allow it to air dry  · Change your bandages  when they get wet, dirty, or after washing  Apply new, clean bandages as directed  Do not apply elastic bandages or tape too tight  Do not put powders or lotions over your incision  · Apply antibiotic ointment as directed  Your healthcare provider may give you antibiotic ointment to put over your wound if you have stitches   If you have strips of tape over your incision, let them dry up and fall off on their own  If they do not fall off within 14 days, gently remove them  If you have glue over your wound, do not remove or pick at it  If your glue comes off, do not replace it with glue that you have at home  · Check your wound every day for signs of infection such as swelling, redness, or pus  Self-care:   · Apply ice  on your wound for 15 to 20 minutes every hour or as directed  Use an ice pack, or put crushed ice in a plastic bag  Cover it with a towel  Ice helps prevent tissue damage and decreases swelling and pain  · Use a splint as directed  A splint will decrease movement and stress on your wound  It may help it heal faster  A splint may be used for lacerations over joints or areas of your body that bend  Ask your healthcare provider how to apply and remove a splint  · Decrease scarring of your wound  by applying ointments as directed  Do not apply ointments until your healthcare provider says it is okay  You may need to wait until your wound is healed  Ask which ointment to buy and how often to use it  After your wound is healed, use sunscreen over the area when you are out in the sun  You should do this for at least 6 months to 1 year after your injury  Follow up with your healthcare provider as directed: You may need to follow up in 24 to 48 hours to have your wound checked for infection  You will need to return in 3 to 14 days if you have stitches or staples so they can be removed  Care for your wound as directed to prevent infection and help it heal  Write down your questions so you remember to ask them during your visits  © 2017 [facility] Information is for End User's use only and may not be sold, redistributed or otherwise used for commercial purposes  All illustrations and images included in CareNotes® are the copyrighted property of [employer] , Inc  or Jim Lind    The above information is an educational aid only  It is not intended as medical advice for individual conditions or treatments  Talk to your doctor, nurse or pharmacist before following any medical regimen to see if it is safe and effective for you  Staple Care   WHAT YOU NEED TO KNOW:   Staples are often used to close a wound  Your staples may be placed for 3 to 14 days, depending on the location of your wound  DISCHARGE INSTRUCTIONS:   Care for your wound:   · Clean:      ¨ You may be able to shower in 24 hours  Do not soak your wound under water  ¨ Gently wash your wound with soap and warm water daily  Lightly pat it dry  Do not cover your wound unless your healthcare provider tells you to  ¨ You may also need to clean your wound with a mixture of hydrogen peroxide and water  Ask how to do this  ¨ Do not apply ointment or cream to the wound unless your healthcare provider tells you to  · Elevate:      ¨ Rest any arm or leg that has a wound on pillows above the level of your heart  Do this as often as possible for 2 days  This will help decrease swelling and pain, and help you heal faster  · Minimize scarring:      ¨ Avoid sunshine on your wound to reduce scarring  Follow up with your healthcare provider as directed: You may need to return for a wound checkup 3 days after your staples are placed  Ask when you should return to get your staples removed  Staple removal:   · A medical staple remover  will be used to take out your staples  Your healthcare provider will slide the tool under each staple, squeeze the handle, and gently pull the staple out  · Medical tape  will be placed on your wound once your staples are removed  This will help keep your wound closed  The medical tape will fall off on its own after several days  Contact your healthcare provider if:   · You have redness, pain, swelling, and pus draining from your wound  · Your pain medicine does not relieve your pain      · You have a fever of 101°F (38 5°C) or higher  · You have an odor coming from your wound  · You have questions or concerns about your condition or care  Return to the emergency department if:   · Your wound reopens  · You have red streaks in your skin that spread out from your wound  · You have severe pain or vomiting  © 2017 [address] Information is for End User's use only and may not be sold, redistributed or otherwise used for commercial purposes  All illustrations and images included in CareNotes® are the copyrighted property of [employer]  or [employer] Lind  The above information is an  only  It is not intended as medical advice for individual conditions or treatments  Talk to your doctor, nurse or pharmacist before following any medical regimen to see if it is safe and effective for you

## 2018-06-04 ENCOUNTER — OFFICE VISIT (OUTPATIENT)
Dept: URGENT CARE | Age: 38
End: 2018-06-04
Payer: COMMERCIAL

## 2018-06-04 VITALS
HEIGHT: 70 IN | HEART RATE: 85 BPM | DIASTOLIC BLOOD PRESSURE: 85 MMHG | WEIGHT: 248.2 LBS | OXYGEN SATURATION: 98 % | SYSTOLIC BLOOD PRESSURE: 140 MMHG | RESPIRATION RATE: 16 BRPM | BODY MASS INDEX: 35.53 KG/M2 | TEMPERATURE: 97.1 F

## 2018-06-04 DIAGNOSIS — K52.9 NONINFECTIOUS GASTROENTERITIS, UNSPECIFIED TYPE: Primary | ICD-10-CM

## 2018-06-04 PROCEDURE — 99213 OFFICE O/P EST LOW 20 MIN: CPT | Performed by: PHYSICIAN ASSISTANT

## 2018-06-04 NOTE — PATIENT INSTRUCTIONS
Drink plenty of fluids  Follow up with family doctor this week  Go to ER if new or worsening symptoms occur  Acute Nausea and Vomiting   WHAT YOU NEED TO KNOW:   Acute nausea and vomiting start suddenly, worsen quickly, and last a short time  DISCHARGE INSTRUCTIONS:   Return to the emergency department if:   · You see blood in your vomit or your bowel movements  · You have sudden, severe pain in your chest and upper abdomen after hard vomiting or retching  · You have swelling in your neck and chest      · You are dizzy, cold, and thirsty and your eyes and mouth are dry  · You are urinating very little or not at all  · You have muscle weakness, leg cramps, and trouble breathing  · Your heart is beating much faster than normal      · You continue to vomit for more than 48 hours  Contact your healthcare provider if:   · You have frequent dry heaves (vomiting but nothing comes out)  · Your nausea and vomiting does not get better or go away after you use medicine  · You have questions or concerns about your condition or treatment  Medicines: You may need any of the following:  · Medicines  may be given to calm your stomach and stop your vomiting  You may also need medicines to help you feel more relaxed or to stop nausea and vomiting caused by motion sickness  · Gastrointestinal stimulants  are used to help empty your stomach and bowels  This may help decrease nausea and vomiting  · Take your medicine as directed  Contact your healthcare provider if you think your medicine is not helping or if you have side effects  Tell him or her if you are allergic to any medicine  Keep a list of the medicines, vitamins, and herbs you take  Include the amounts, and when and why you take them  Bring the list or the pill bottles to follow-up visits  Carry your medicine list with you in case of an emergency  Prevent or manage acute nausea and vomiting:   · Do not drink alcohol    Alcohol may upset or irritate your stomach  Too much alcohol can also cause acute nausea and vomiting  · Control stress  Headaches due to stress may cause nausea and vomiting  Find ways to relax and manage your stress  Get more rest and sleep  · Drink more liquids as directed  Vomiting can lead to dehydration  It is important to drink more liquids to help replace lost body fluids  Ask your healthcare provider how much liquid to drink each day and which liquids are best for you  Your provider may recommend that you drink an oral rehydration solution (ORS)  ORS contains water, salts, and sugar that are needed to replace the lost body fluids  Ask what kind of ORS to use, how much to drink, and where to get it  · Eat smaller meals, more often  Eat small amounts of food every 2 to 3 hours, even if you are not hungry  Food in your stomach may decrease your nausea  · Talk to your healthcare provider before you take over-the-counter (OTC) medicines  These medicines can cause serious problems if you use certain other medicines, or you have a medical condition  You may have problems if you use too much or use them for longer than the label says  Follow directions on the label carefully  Follow up with your healthcare provider as directed:  Write down your questions so you remember to ask them during your follow-up visits  © 2017 [employer]  Information is for End User's use only and may not be sold, redistributed or otherwise used for commercial purposes  All illustrations and images included in CareNotes® are the copyrighted property of [employer]  or [employer]  The above information is an  only  It is not intended as medical advice for individual conditions or treatments  Talk to your doctor, nurse or pharmacist before following any medical regimen to see if it is safe and effective for you

## 2018-06-04 NOTE — PROGRESS NOTES
[facility]        NAME: Alesha Brennan is a 43 y o  male  : 1980    MRN: [id number]  DATE: 2018  TIME: 5:11 PM    Assessment and Plan   Noninfectious gastroenteritis, unspecified type [K52 9]  1  Noninfectious gastroenteritis, unspecified type           Patient Instructions       Drink plenty of fluids  Follow up with family doctor this week  Go to ER if new or worsening symptoms occur  Chief Complaint     Chief Complaint   Patient presents with    Vomiting     Pt stayed home from work from Wednesday thru Friday due to a stomach virus  Today, when he returned to work they asked him to provided a doctor's note to return to work  History of Present Illness       Patient was out of work Wednesday- Friday due to vomiting/diarrhea  Patients symptoms have completely resolved since then and he feels back to normal  Patient states that he went to work today and they told him he needs a note saying he can return to work  Patient currently has no symptoms  Eating and drinking normally  No NVD  No abdominal pain  No fevers or chills  Review of Systems   Review of Systems   Constitutional: Negative for chills, diaphoresis, fatigue and fever  HENT: Negative for congestion, postnasal drip, sinus pressure, sore throat and trouble swallowing  Eyes: Negative  Respiratory: Negative for shortness of breath and wheezing  Cardiovascular: Negative  Gastrointestinal: Negative for abdominal distention, diarrhea, nausea and vomiting  Endocrine: Negative  Genitourinary: Negative for dysuria  Musculoskeletal: Negative  Skin: Negative for rash  Allergic/Immunologic: Negative  Neurological: Negative  Negative for light-headedness and headaches  Hematological: Negative  Psychiatric/Behavioral: Negative            Current Medications       Current Outpatient Prescriptions:     albuterol (PROVENTIL HFA,VENTOLIN HFA) 90 mcg/act inhaler, Inhale 2 puffs every 6 (six) hours as needed for wheezing, Disp: 6 7 g, Rfl: 0    Current Allergies     Allergies as of 06/04/2018 - Reviewed 06/04/2018   Allergen Reaction Noted    Aspirin Hives 10/28/2016            The following portions of the patient's history were reviewed and updated as appropriate: allergies, current medications, past family history, past medical history, past social history, past surgical history and problem list      Past Medical History:   Diagnosis Date    Asthma     Hepatitis C        Past Surgical History:   Procedure Laterality Date    BACK SURGERY  2010    LUMBAR PUNCTURE         History reviewed  No pertinent family history  Medications have been verified  Objective   /85   Pulse 85   Temp (!) 97 1 °F (36 2 °C) (Tympanic)   Resp 16   Ht 5' 10" (1 778 m)   Wt 113 kg (248 lb 3 2 oz)   SpO2 98%   BMI 35 61 kg/m²        Physical Exam     Physical Exam   Constitutional: He appears well-developed and well-nourished  No distress  HENT:   Head: Normocephalic and atraumatic  Right Ear: External ear normal    Left Ear: External ear normal    Eyes: Conjunctivae are normal  Right eye exhibits no discharge  Left eye exhibits no discharge  Neck: Normal range of motion  Neck supple  Cardiovascular: Normal rate, regular rhythm, normal heart sounds and intact distal pulses  Pulmonary/Chest: Effort normal and breath sounds normal  No respiratory distress  He has no wheezes  He has no rales  Abdominal: Soft  Bowel sounds are normal  He exhibits no distension and no mass  There is no tenderness  There is no rebound and no guarding  Lymphadenopathy:     He has no cervical adenopathy  Skin: Skin is warm  No rash noted  He is not diaphoretic  Nursing note and vitals reviewed

## 2018-06-04 NOTE — LETTER
June 4, 2018     Patient: Vel Mitchell   YOB: 1980   Date of Visit: 6/4/2018       To Whom It May Concern: It is my medical opinion that Vel Mitchell may return to full duty immediately with no restrictions  If you have any questions or concerns, please don't hesitate to call           Sincerely,        Chino Ibrahim PA-C    CC: No Recipients

## 2018-09-09 ENCOUNTER — OFFICE VISIT (OUTPATIENT)
Dept: URGENT CARE | Age: 38
End: 2018-09-09
Payer: COMMERCIAL

## 2018-09-09 VITALS
DIASTOLIC BLOOD PRESSURE: 86 MMHG | BODY MASS INDEX: 37.94 KG/M2 | TEMPERATURE: 97.3 F | HEIGHT: 70 IN | OXYGEN SATURATION: 100 % | RESPIRATION RATE: 16 BRPM | WEIGHT: 265 LBS | HEART RATE: 73 BPM | SYSTOLIC BLOOD PRESSURE: 146 MMHG

## 2018-09-09 DIAGNOSIS — K08.89 PAIN, DENTAL: Primary | ICD-10-CM

## 2018-09-09 PROCEDURE — 99213 OFFICE O/P EST LOW 20 MIN: CPT | Performed by: PHYSICIAN ASSISTANT

## 2018-09-09 RX ORDER — AMOXICILLIN 500 MG/1
CAPSULE ORAL
Qty: 22 CAPSULE | Refills: 0 | Status: SHIPPED | OUTPATIENT
Start: 2018-09-09 | End: 2018-09-16

## 2018-09-09 RX ORDER — IBUPROFEN 600 MG/1
600 TABLET ORAL ONCE
Status: COMPLETED | OUTPATIENT
Start: 2018-09-09 | End: 2018-09-09

## 2018-09-09 RX ADMIN — IBUPROFEN 600 MG: 600 TABLET ORAL at 18:22

## 2018-09-09 NOTE — LETTER
September 9, 2018     Patient: Harper Urias   YOB: 1980   Date of Visit: 9/9/2018       To Whom It May Concern: It is my medical opinion that Harper Urias may return to work on 9/11/2018  If you have any questions or concerns, please don't hesitate to call           Sincerely,        Radha Dubose PA-C    CC: No Recipients

## 2018-09-09 NOTE — PATIENT INSTRUCTIONS
Take amoxicillin as prescribed  Tylenol 1000mg (1g) every 4-6 hours together with Ibuprofen 600mg every 4-6 hours  Do not exceed 1000mg (1g) of Tylenol in 4 hours or 4000mg (4g) in 24 hours  Do not exceed 2400mg of Ibuprofen in 24 hours  Follow up with dentist  Salt water gargles  Ice over area  Dentist Appointment ASAP  Follow up with PCP in 3-5 days  Proceed to  ER if symptoms worsen  Acute Dental Trauma   WHAT YOU NEED TO KNOW:   Acute dental trauma is a serious injury to one or more parts of your mouth  Your injury may include damage to any of your teeth, the tooth socket, the tooth root, or your jaw  You can also have injuries to the soft tissues of your mouth  These include your tongue, cheeks, gums, and lips  Severe injuries can expose the soft pulp inside the tooth  DISCHARGE INSTRUCTIONS:   Call 911 for any of the following:   · You have trouble breathing  Return to the emergency department immediately if:   · You lose one or more of your teeth, or your tooth moves out of place  · You have severe bleeding in your mouth that does not stop  Contact your healthcare provider if:   · You have a fever  · You have new symptoms, or your symptoms become worse  · You feel pain when air gets in contact with your damaged tooth  · You have tooth pain when you eat foods that are hot, cold, sweet, or sour  · Your tooth's color becomes darker  · You have questions or concern about your condition or care  Medicines: You may  need any of the following:  · Antibiotics  help treat or prevent a bacterial infection  · Acetaminophen  decreases pain and fever  It is available without a doctor's order  Ask how much to take and how often to take it  Follow directions  Read the labels of all other medicines you are using to see if they also contain acetaminophen, or ask your doctor or pharmacist  Acetaminophen can cause liver damage if not taken correctly   Do not use more than 4 grams (4,000 milligrams) total of acetaminophen in one day  · Take your medicine as directed  Contact your healthcare provider if you think your medicine is not helping or if you have side effects  Tell him or her if you are allergic to any medicine  Keep a list of the medicines, vitamins, and herbs you take  Include the amounts, and when and why you take them  Bring the list or the pill bottles to follow-up visits  Carry your medicine list with you in case of an emergency  Self-care:   · Apply ice  on your jaw or cheek for 15 to 20 minutes every hour or as directed  Use an ice pack, or put crushed ice in a plastic bag  Cover it with a towel  Ice helps prevent tissue damage and decreases swelling and pain  · Do not use your damaged tooth  Chewing food on your damaged tooth may put too much pressure on it and worsen your injury  · Eat soft foods or drink liquids  Soft foods and liquids may be easier to eat until your injury heals  Soft foods include applesauce, pudding, mashed potatoes, gelatin, or ice cream      · Keep your wounds clean  Use prescribed mouthwash as directed or gargle with a salt water solution  Mix 1 teaspoon of salt and 1 cup of warm water  You can also clean your wounds with hydrogen peroxide swabs  Ask your healthcare provider for more information on how to clean your wounds  · Wear protective gear when you play sports  Always wear a helmet and mouth guard that meet safety standards  These will prevent damage to your gums, teeth, and the bones that support your mouth  Follow up with your healthcare provider as directed:  Write down your questions so you remember to ask them during your visits  © 2017 [employer] Information is for End User's use only and may not be sold, redistributed or otherwise used for commercial purposes  All illustrations and images included in CareNotes® are the copyrighted property of [employer]  or [employer]    The above information is an  only  It is not intended as medical advice for individual conditions or treatments  Talk to your doctor, nurse or pharmacist before following any medical regimen to see if it is safe and effective for you

## 2018-09-09 NOTE — PROGRESS NOTES
[facility]        NAME: Chaitanya De La Cruz is a 43 y o  male  : 1980    MRN: [id number]  DATE: 2018  TIME: 6:21 PM    Assessment and Plan   Pain, dental [K08 89]  1  Pain, dental  amoxicillin (AMOXIL) 500 mg capsule    ibuprofen (MOTRIN) tablet 600 mg     Ibuprofen was administered in office  Patient Instructions     Take amoxicillin as prescribed  Tylenol 1000mg (1g) every 4-6 hours together with Ibuprofen 600mg every 4-6 hours  Do not exceed 1000mg (1g) of Tylenol in 4 hours or 4000mg (4g) in 24 hours  Do not exceed 2400mg of Ibuprofen in 24 hours  Follow up with dentist  Salt water gargles  Ice over area  Dentist Appointment ASAP  Follow up with PCP in 3-5 days  Proceed to  ER if symptoms worsen  Chief Complaint     Chief Complaint   Patient presents with    Dental Pain     1 week; tooth started breaking 1 month ago  too Excedrin         History of Present Illness       States he broke a tooth 1 month ago that continues to break away  States he does not have a dentist but is trying to find one  Admits to aspirin allergy-hives  States he has taken Advil and Aleve without reaction  Dental Pain    This is a new problem  The current episode started more than 1 month ago  The problem occurs constantly  The problem has been gradually worsening  The pain is at a severity of 9/10  Associated symptoms include facial pain and thermal sensitivity  Pertinent negatives include no difficulty swallowing, fever, oral bleeding or sinus pressure  He has tried NSAIDs and acetaminophen for the symptoms  The treatment provided moderate relief  Review of Systems   Review of Systems   Constitutional: Negative for activity change, appetite change, chills and fever  HENT: Positive for dental problem  Negative for congestion, ear discharge, ear pain, facial swelling, postnasal drip, rhinorrhea, sinus pain, sinus pressure, sneezing, sore throat and trouble swallowing      Eyes: Negative for itching  Respiratory: Negative for cough and shortness of breath  Cardiovascular: Negative for chest pain and palpitations  Gastrointestinal: Negative for abdominal pain, constipation, diarrhea, nausea and vomiting  Musculoskeletal: Negative for myalgias  Skin: Negative for rash  Neurological: Negative for dizziness, weakness, light-headedness and headaches  Current Medications       Current Outpatient Prescriptions:     albuterol (PROVENTIL HFA,VENTOLIN HFA) 90 mcg/act inhaler, Inhale 2 puffs every 6 (six) hours as needed for wheezing, Disp: 6 7 g, Rfl: 0    amoxicillin (AMOXIL) 500 mg capsule, Take 1000mg (2 pills) as first dose  Take 500mg (1 pill) three times per day for 7 days  , Disp: 22 capsule, Rfl: 0    Current Facility-Administered Medications:     ibuprofen (MOTRIN) tablet 600 mg, 600 mg, Oral, Once, Vianney Borges PA-C    Current Allergies     Allergies as of 09/09/2018 - Reviewed 09/09/2018   Allergen Reaction Noted    Aspirin Hives 10/28/2016            The following portions of the patient's history were reviewed and updated as appropriate: allergies, current medications, past family history, past medical history, past social history, past surgical history and problem list      Past Medical History:   Diagnosis Date    Asthma     Hepatitis C        Past Surgical History:   Procedure Laterality Date    BACK SURGERY  2010    LUMBAR PUNCTURE         No family history on file  Medications have been verified  Objective   /86   Pulse 73   Temp (!) 97 3 °F (36 3 °C)   Resp 16   Ht 5' 10" (1 778 m)   Wt 120 kg (265 lb)   SpO2 100%   BMI 38 02 kg/m²        Physical Exam     Physical Exam   Constitutional: He is oriented to person, place, and time  He appears well-developed and well-nourished  No distress  HENT:   Head: Normocephalic and atraumatic     Right Ear: External ear normal    Left Ear: External ear normal    Mouth/Throat: Oropharynx is clear and moist  Abnormal dentition  Dental caries present  No dental abscesses  No oropharyngeal exudate, posterior oropharyngeal edema or posterior oropharyngeal erythema  Multiple missing and broken teeth   Eyes: Conjunctivae are normal    Cardiovascular: Normal rate, regular rhythm and normal heart sounds  Exam reveals no gallop and no friction rub  No murmur heard  Pulmonary/Chest: Effort normal and breath sounds normal  No respiratory distress  He has no wheezes  He has no rales  He exhibits no tenderness  Lymphadenopathy:     He has no cervical adenopathy  Neurological: He is alert and oriented to person, place, and time  Skin: Skin is warm  Psychiatric: He has a normal mood and affect  His behavior is normal  Judgment and thought content normal    Vitals reviewed

## 2018-09-18 ENCOUNTER — HOSPITAL ENCOUNTER (EMERGENCY)
Facility: HOSPITAL | Age: 38
Discharge: HOME | End: 2018-09-18
Attending: EMERGENCY MEDICINE | Admitting: EMERGENCY MEDICINE
Payer: COMMERCIAL

## 2018-09-18 VITALS
BODY MASS INDEX: 37.88 KG/M2 | RESPIRATION RATE: 16 BRPM | WEIGHT: 264 LBS | OXYGEN SATURATION: 98 % | TEMPERATURE: 97.8 F | DIASTOLIC BLOOD PRESSURE: 80 MMHG | SYSTOLIC BLOOD PRESSURE: 138 MMHG | HEART RATE: 94 BPM

## 2018-09-18 DIAGNOSIS — K08.89 PAIN, DENTAL: Primary | ICD-10-CM

## 2018-09-18 PROCEDURE — 99282 EMERGENCY DEPT VISIT SF MDM: CPT

## 2018-09-18 RX ORDER — ACETAMINOPHEN 325 MG/1
650 TABLET ORAL ONCE
Status: COMPLETED | OUTPATIENT
Start: 2018-09-18 | End: 2018-09-18

## 2018-09-18 RX ORDER — SENNOSIDES 8.6 MG
650 CAPSULE ORAL EVERY 8 HOURS PRN
Qty: 30 TABLET | Refills: 0 | Status: SHIPPED | OUTPATIENT
Start: 2018-09-18

## 2018-09-18 RX ORDER — CLINDAMYCIN HYDROCHLORIDE 150 MG/1
150 CAPSULE ORAL EVERY 6 HOURS
Qty: 28 CAPSULE | Refills: 0 | Status: SHIPPED | OUTPATIENT
Start: 2018-09-18 | End: 2018-09-25

## 2018-09-18 RX ADMIN — ACETAMINOPHEN 650 MG: 325 TABLET, FILM COATED ORAL at 13:12

## 2018-09-18 NOTE — ED PROVIDER NOTES
History  Chief Complaint   Patient presents with    Dental Pain     right upper dental pain  no facial swelling  Patient is a 76-year-old male that presents emergency department with worsening intermittent throbbing upper right tooth pain for one month with radiation to right ear  Patient states the right upper tooth pain became constant 2 weeks ago  Patient denies any alleviating factors  Patient of firmness provocative factors by way of chewing solid food and pressure to the affected area  Patient denies any solitary incident with pain commencement  Patient stated he has made unsuccessful attempts to procure a dental appointment  Patient denies fever, chills, nausea, and vomiting  No diarrhea or constipation  No bowel or bladder symptomatology  No chest pain, shortness of breath, and abdominal pain  Patient is not in acute distress  History provided by:  Patient   used: No    Dental Pain   Location:  Upper  Upper teeth location:  2/RU 2nd molar  Quality:  Aching, constant, sharp and throbbing  Severity:  Moderate  Duration:  1 month  Timing:  Intermittent  Progression:  Worsening  Chronicity:  New  Context: dental fracture    Context: normal dentition, not recent dental surgery and not trauma    Relieved by:  NSAIDs  Worsened by:  Cold food/drink, hot food/drink, touching, jaw movement and pressure  Ineffective treatments:  None tried  Associated symptoms: facial pain    Associated symptoms: no congestion, no drooling, no facial swelling, no fever, no gum swelling and no headaches        Prior to Admission Medications   Prescriptions Last Dose Informant Patient Reported? Taking?    albuterol (PROVENTIL HFA,VENTOLIN HFA) 90 mcg/act inhaler   No No   Sig: Inhale 2 puffs every 6 (six) hours as needed for wheezing      Facility-Administered Medications: None       Past Medical History:   Diagnosis Date    Asthma     Hepatitis C        Past Surgical History:   Procedure Laterality Date    BACK SURGERY  2010    LUMBAR PUNCTURE         History reviewed  No pertinent family history  I have reviewed and agree with the history as documented  Social History   Substance Use Topics    Smoking status: Current Every Day Smoker     Packs/day: 0 50     Types: Cigarettes    Smokeless tobacco: Never Used    Alcohol use Yes      Comment: "occasionally"        Review of Systems   Constitutional: Negative for activity change, appetite change, chills and fever  HENT: Positive for dental problem  Negative for congestion, drooling, facial swelling, hearing loss, nosebleeds, sinus pressure, sore throat and trouble swallowing  Eyes: Negative for photophobia, pain, itching and visual disturbance  Respiratory: Negative for cough, chest tightness and shortness of breath  Cardiovascular: Negative for chest pain and palpitations  Gastrointestinal: Negative for abdominal pain, constipation, diarrhea, nausea and vomiting  Genitourinary: Negative for difficulty urinating, dysuria and frequency  Musculoskeletal: Negative for back pain and gait problem  Skin: Negative for pallor and rash  Allergic/Immunologic: Negative for environmental allergies and food allergies  Neurological: Negative for dizziness and headaches  Psychiatric/Behavioral: Negative for confusion  All other systems reviewed and are negative  Physical Exam  Physical Exam   Constitutional: He is oriented to person, place, and time  Vital signs are normal  He appears well-developed and well-nourished  He is cooperative  Non-toxic appearance  He does not have a sickly appearance  He does not appear ill  No distress  HENT:   Head: Normocephalic and atraumatic  Right Ear: Hearing, tympanic membrane, external ear and ear canal normal  No lacerations  No drainage, swelling or tenderness  No mastoid tenderness  No middle ear effusion  No decreased hearing is noted     Left Ear: Hearing, tympanic membrane, external ear and ear canal normal  No lacerations  No drainage, swelling or tenderness  No mastoid tenderness  No middle ear effusion  No decreased hearing is noted  Nose: Nose normal  Right sinus exhibits no maxillary sinus tenderness and no frontal sinus tenderness  Left sinus exhibits no maxillary sinus tenderness and no frontal sinus tenderness  Mouth/Throat: Uvula is midline, oropharynx is clear and moist and mucous membranes are normal  No oral lesions  Abnormal dentition  No dental abscesses, uvula swelling or dental caries  No posterior oropharyngeal edema, posterior oropharyngeal erythema or tonsillar abscesses  Tonsils are 1+ on the right  Tonsils are 1+ on the left  No tonsillar exudate  Eyes: Conjunctivae, EOM and lids are normal  Pupils are equal, round, and reactive to light  Right eye exhibits no discharge  Left eye exhibits no discharge  Neck: Trachea normal, normal range of motion, full passive range of motion without pain and phonation normal  Neck supple  No tracheal tenderness present  No neck rigidity  No tracheal deviation, no edema, no erythema and normal range of motion present  No thyroid mass and no thyromegaly present  Cardiovascular: Normal rate, regular rhythm, normal heart sounds, intact distal pulses and normal pulses  Pulses:       Carotid pulses are 2+ on the right side, and 2+ on the left side  Radial pulses are 2+ on the right side, and 2+ on the left side  Pulmonary/Chest: Effort normal and breath sounds normal  No stridor  Abdominal: Soft  Normal appearance and bowel sounds are normal  He exhibits no distension  There is no tenderness  There is no guarding  Musculoskeletal: Normal range of motion  Right shoulder: He exhibits normal range of motion  Lymphadenopathy:        Head (right side): No submental, no submandibular, no tonsillar, no preauricular, no posterior auricular and no occipital adenopathy present  Head (left side):  No submental, no submandibular, no tonsillar, no preauricular, no posterior auricular and no occipital adenopathy present  He has no cervical adenopathy  Right cervical: No superficial cervical, no deep cervical and no posterior cervical adenopathy present  Left cervical: No superficial cervical, no deep cervical and no posterior cervical adenopathy present  Neurological: He is alert and oriented to person, place, and time  He has normal reflexes  Skin: Skin is warm  Capillary refill takes 2 to 3 seconds  He is not diaphoretic  No pallor  Psychiatric: He has a normal mood and affect         Vital Signs  ED Triage Vitals [09/18/18 1147]   Temperature Pulse Respirations Blood Pressure SpO2   97 8 °F (36 6 °C) 94 16 138/80 98 %      Temp Source Heart Rate Source Patient Position - Orthostatic VS BP Location FiO2 (%)   Temporal Monitor -- Right arm --      Pain Score       Worst Possible Pain           Vitals:    09/18/18 1147   BP: 138/80   Pulse: 94       Visual Acuity      ED Medications  Medications   acetaminophen (TYLENOL) tablet 650 mg (650 mg Oral Given 9/18/18 1312)       Diagnostic Studies  Results Reviewed     None                 No orders to display              Procedures  Procedures       Phone Contacts  ED Phone Contact    ED Course                               MDM  Number of Diagnoses or Management Options  Pain, dental: minor  Diagnosis management comments: Differential diagnosis and have plates but is not limited to:  Dental abscess  Cracked tooth  Dental cavity  Maxillary sinusitis    Delivered Tylenol in ED  Prescribed clindamycin and tylenol  Follow-up with Sanjeev  Follow-up with PCP  Follow-up with Emergency department if symptoms persist or exacerbate    Risk of Complications, Morbidity, and/or Mortality  Presenting problems: minimal    Patient Progress  Patient progress: stable    CritCare Time    Disposition  Final diagnoses:   Pain, dental     Time reflects when diagnosis was documented in both MDM as applicable and the Disposition within this note     Time User Action Codes Description Comment    9/18/2018  1:28 PM Fabrizio Valero Add [K08 89] Pain, dental       ED Disposition     ED Disposition Condition Comment    Discharge  Lindsay Enriquez discharge to home/self care  Condition at discharge: Stable        Follow-up Information     Follow up With Specialties Details Why [address] [facility] Emergency Department Emergency Medicine Go to As needed Iram Garcia [address] [place] ED, [address] [place], [place], [address] [facility]  Call in 1 day for further evaluation of symptoms [address] [facility]  [phone]           Discharge Medication List as of 9/18/2018  1:38 PM      START taking these medications    Details   acetaminophen (TYLENOL) 650 mg CR tablet Take 1 tablet (650 mg total) by mouth every 8 (eight) hours as needed for mild pain, Starting Tue 9/18/2018, Normal      clindamycin (CLEOCIN) 150 mg capsule Take 1 capsule (150 mg total) by mouth every 6 (six) hours for 7 days, Starting Tue 9/18/2018, Until Tue 9/25/2018, Print         CONTINUE these medications which have NOT CHANGED    Details   albuterol (PROVENTIL HFA,VENTOLIN HFA) 90 mcg/act inhaler Inhale 2 puffs every 6 (six) hours as needed for wheezing, Starting Thu 1/18/2018, Print           No discharge procedures on file      ED Provider  Electronically Signed by           Song Nichols PA-C  09/18/18 9050

## 2022-04-16 ENCOUNTER — APPOINTMENT (EMERGENCY)
Dept: RADIOLOGY | Facility: HOSPITAL | Age: 42
End: 2022-04-16
Payer: COMMERCIAL

## 2022-04-16 ENCOUNTER — HOSPITAL ENCOUNTER (EMERGENCY)
Facility: HOSPITAL | Age: 42
Discharge: HOME | End: 2022-04-17
Attending: EMERGENCY MEDICINE | Admitting: EMERGENCY MEDICINE
Payer: COMMERCIAL

## 2022-04-16 DIAGNOSIS — R07.9 INTERMITTENT CHEST PAIN: ICD-10-CM

## 2022-04-16 DIAGNOSIS — R59.0 ENLARGED LYMPH NODE IN NECK: ICD-10-CM

## 2022-04-16 DIAGNOSIS — E11.9 DIABETES (HCC): Primary | ICD-10-CM

## 2022-04-16 DIAGNOSIS — R73.9 HYPERGLYCEMIA: ICD-10-CM

## 2022-04-16 DIAGNOSIS — R74.01 TRANSAMINITIS: ICD-10-CM

## 2022-04-16 LAB
ALBUMIN SERPL BCP-MCNC: 3.9 G/DL (ref 3.5–5)
ALP SERPL-CCNC: 206 U/L (ref 46–116)
ALT SERPL W P-5'-P-CCNC: 133 U/L (ref 12–78)
ANION GAP SERPL CALCULATED.3IONS-SCNC: 10 MMOL/L (ref 4–13)
AST SERPL W P-5'-P-CCNC: 71 U/L (ref 5–45)
BASE EX.OXY STD BLDV CALC-SCNC: 79.2 % (ref 60–80)
BASE EXCESS BLDV CALC-SCNC: 1.2 MMOL/L
BASOPHILS # BLD AUTO: 0.09 THOUSANDS/ΜL (ref 0–0.1)
BASOPHILS NFR BLD AUTO: 1 % (ref 0–1)
BILIRUB DIRECT SERPL-MCNC: 0.15 MG/DL (ref 0–0.2)
BILIRUB SERPL-MCNC: 0.48 MG/DL (ref 0.2–1)
BUN SERPL-MCNC: 12 MG/DL (ref 5–25)
CALCIUM SERPL-MCNC: 9.3 MG/DL (ref 8.3–10.1)
CARDIAC TROPONIN I PNL SERPL HS: <2 NG/L
CHLORIDE SERPL-SCNC: 93 MMOL/L (ref 100–108)
CO2 SERPL-SCNC: 27 MMOL/L (ref 21–32)
CREAT SERPL-MCNC: 0.8 MG/DL (ref 0.6–1.3)
EOSINOPHIL # BLD AUTO: 0.43 THOUSAND/ΜL (ref 0–0.61)
EOSINOPHIL NFR BLD AUTO: 5 % (ref 0–6)
ERYTHROCYTE [DISTWIDTH] IN BLOOD BY AUTOMATED COUNT: 11.8 % (ref 11.6–15.1)
GFR SERPL CREATININE-BSD FRML MDRD: 109 ML/MIN/1.73SQ M
GLUCOSE SERPL-MCNC: 439 MG/DL (ref 65–140)
GLUCOSE SERPL-MCNC: >500 MG/DL (ref 65–140)
HCO3 BLDV-SCNC: 26.7 MMOL/L (ref 24–30)
HCT VFR BLD AUTO: 45.8 % (ref 36.5–49.3)
HGB BLD-MCNC: 16.6 G/DL (ref 12–17)
IMM GRANULOCYTES # BLD AUTO: 0.04 THOUSAND/UL (ref 0–0.2)
IMM GRANULOCYTES NFR BLD AUTO: 0 % (ref 0–2)
LIPASE SERPL-CCNC: 113 U/L (ref 73–393)
LYMPHOCYTES # BLD AUTO: 2.57 THOUSANDS/ΜL (ref 0.6–4.47)
LYMPHOCYTES NFR BLD AUTO: 27 % (ref 14–44)
MCH RBC QN AUTO: 30 PG (ref 26.8–34.3)
MCHC RBC AUTO-ENTMCNC: 36.2 G/DL (ref 31.4–37.4)
MCV RBC AUTO: 83 FL (ref 82–98)
MONOCYTES # BLD AUTO: 0.64 THOUSAND/ΜL (ref 0.17–1.22)
MONOCYTES NFR BLD AUTO: 7 % (ref 4–12)
NEUTROPHILS # BLD AUTO: 5.77 THOUSANDS/ΜL (ref 1.85–7.62)
NEUTS SEG NFR BLD AUTO: 60 % (ref 43–75)
NRBC BLD AUTO-RTO: 0 /100 WBCS
O2 CT BLDV-SCNC: 19 ML/DL
PCO2 BLDV: 45.3 MM HG (ref 42–50)
PH BLDV: 7.39 [PH] (ref 7.3–7.4)
PLATELET # BLD AUTO: 195 THOUSANDS/UL (ref 149–390)
PMV BLD AUTO: 11.4 FL (ref 8.9–12.7)
PO2 BLDV: 45.4 MM HG (ref 35–45)
POTASSIUM SERPL-SCNC: 4 MMOL/L (ref 3.5–5.3)
PROT SERPL-MCNC: 8.6 G/DL (ref 6.4–8.2)
RBC # BLD AUTO: 5.53 MILLION/UL (ref 3.88–5.62)
S PYO DNA THROAT QL NAA+PROBE: NOT DETECTED
SODIUM SERPL-SCNC: 130 MMOL/L (ref 136–145)
WBC # BLD AUTO: 9.54 THOUSAND/UL (ref 4.31–10.16)

## 2022-04-16 PROCEDURE — 71046 X-RAY EXAM CHEST 2 VIEWS: CPT

## 2022-04-16 PROCEDURE — 99285 EMERGENCY DEPT VISIT HI MDM: CPT | Performed by: EMERGENCY MEDICINE

## 2022-04-16 PROCEDURE — 96365 THER/PROPH/DIAG IV INF INIT: CPT

## 2022-04-16 PROCEDURE — 85025 COMPLETE CBC W/AUTO DIFF WBC: CPT | Performed by: EMERGENCY MEDICINE

## 2022-04-16 PROCEDURE — 82948 REAGENT STRIP/BLOOD GLUCOSE: CPT

## 2022-04-16 PROCEDURE — 96366 THER/PROPH/DIAG IV INF ADDON: CPT

## 2022-04-16 PROCEDURE — 80048 BASIC METABOLIC PNL TOTAL CA: CPT | Performed by: EMERGENCY MEDICINE

## 2022-04-16 PROCEDURE — 86308 HETEROPHILE ANTIBODY SCREEN: CPT | Performed by: EMERGENCY MEDICINE

## 2022-04-16 PROCEDURE — 84484 ASSAY OF TROPONIN QUANT: CPT | Performed by: EMERGENCY MEDICINE

## 2022-04-16 PROCEDURE — 93005 ELECTROCARDIOGRAM TRACING: CPT

## 2022-04-16 PROCEDURE — 80076 HEPATIC FUNCTION PANEL: CPT | Performed by: EMERGENCY MEDICINE

## 2022-04-16 PROCEDURE — 36415 COLL VENOUS BLD VENIPUNCTURE: CPT | Performed by: EMERGENCY MEDICINE

## 2022-04-16 PROCEDURE — 87651 STREP A DNA AMP PROBE: CPT | Performed by: EMERGENCY MEDICINE

## 2022-04-16 PROCEDURE — 82805 BLOOD GASES W/O2 SATURATION: CPT | Performed by: EMERGENCY MEDICINE

## 2022-04-16 PROCEDURE — 83690 ASSAY OF LIPASE: CPT | Performed by: EMERGENCY MEDICINE

## 2022-04-16 PROCEDURE — 99284 EMERGENCY DEPT VISIT MOD MDM: CPT

## 2022-04-16 RX ADMIN — SODIUM CHLORIDE, SODIUM LACTATE, POTASSIUM CHLORIDE, AND CALCIUM CHLORIDE 2000 ML: .6; .31; .03; .02 INJECTION, SOLUTION INTRAVENOUS at 22:25

## 2022-04-17 VITALS
DIASTOLIC BLOOD PRESSURE: 99 MMHG | OXYGEN SATURATION: 97 % | HEART RATE: 82 BPM | RESPIRATION RATE: 18 BRPM | BODY MASS INDEX: 43.43 KG/M2 | TEMPERATURE: 98.5 F | WEIGHT: 302.69 LBS | SYSTOLIC BLOOD PRESSURE: 159 MMHG

## 2022-04-17 LAB
2HR DELTA HS TROPONIN: >1 NG/L
ATRIAL RATE: 110 BPM
ATRIAL RATE: 90 BPM
ATRIAL RATE: 90 BPM
BACTERIA UR QL AUTO: ABNORMAL /HPF
BILIRUB UR QL STRIP: NEGATIVE
CARDIAC TROPONIN I PNL SERPL HS: 3 NG/L
CLARITY UR: CLEAR
COLOR UR: YELLOW
GLUCOSE UR STRIP-MCNC: ABNORMAL MG/DL
HGB UR QL STRIP.AUTO: NEGATIVE
KETONES UR STRIP-MCNC: NEGATIVE MG/DL
LEUKOCYTE ESTERASE UR QL STRIP: ABNORMAL
NITRITE UR QL STRIP: NEGATIVE
NON-SQ EPI CELLS URNS QL MICRO: ABNORMAL /HPF
P AXIS: 50 DEGREES
P AXIS: 50 DEGREES
P AXIS: 54 DEGREES
PH UR STRIP.AUTO: 6.5 [PH] (ref 4.5–8)
PR INTERVAL: 150 MS
PR INTERVAL: 156 MS
PR INTERVAL: 162 MS
PROT UR STRIP-MCNC: NEGATIVE MG/DL
QRS AXIS: -38 DEGREES
QRS AXIS: -42 DEGREES
QRS AXIS: -59 DEGREES
QRSD INTERVAL: 90 MS
QRSD INTERVAL: 90 MS
QRSD INTERVAL: 92 MS
QT INTERVAL: 300 MS
QT INTERVAL: 342 MS
QT INTERVAL: 350 MS
QTC INTERVAL: 406 MS
QTC INTERVAL: 418 MS
QTC INTERVAL: 428 MS
RBC #/AREA URNS AUTO: ABNORMAL /HPF
SP GR UR STRIP.AUTO: 1.02 (ref 1–1.03)
T WAVE AXIS: 27 DEGREES
T WAVE AXIS: 29 DEGREES
T WAVE AXIS: 33 DEGREES
UROBILINOGEN UR QL STRIP.AUTO: 0.2 E.U./DL
VENTRICULAR RATE: 110 BPM
VENTRICULAR RATE: 90 BPM
VENTRICULAR RATE: 90 BPM
WBC #/AREA URNS AUTO: ABNORMAL /HPF

## 2022-04-17 PROCEDURE — 84484 ASSAY OF TROPONIN QUANT: CPT | Performed by: EMERGENCY MEDICINE

## 2022-04-17 PROCEDURE — 93010 ELECTROCARDIOGRAM REPORT: CPT

## 2022-04-17 PROCEDURE — 93005 ELECTROCARDIOGRAM TRACING: CPT

## 2022-04-17 PROCEDURE — 96366 THER/PROPH/DIAG IV INF ADDON: CPT

## 2022-04-17 PROCEDURE — 81001 URINALYSIS AUTO W/SCOPE: CPT

## 2022-04-17 PROCEDURE — 36415 COLL VENOUS BLD VENIPUNCTURE: CPT | Performed by: EMERGENCY MEDICINE

## 2022-04-17 RX ADMIN — METFORMIN HYDROCHLORIDE 500 MG: 500 TABLET ORAL at 02:01

## 2022-04-17 NOTE — DISCHARGE INSTRUCTIONS
Take Metformin as prescribed    Call Monday to closely follow up with family doctor     Return to ED if you develop any new or worsening symptoms including but not limited to difficulty breathing, severe pain, vomiting, lightheadedness/passing out, etc

## 2022-04-17 NOTE — ED PROVIDER NOTES
History  Chief Complaint   Patient presents with    Medical Problem     patient states generally not feeling well  increased thirtst  increased urination  blurry vision  some chest pain  mother check his blood sugar with home monitor and was greater than 600  mother took his BP and also elevated  no hx     37 yo M presenting for evaluation of 2 weeks of fatigue, increased thirst, polydipsia, polyuria, blurry vision, intermittent CP, SOB  Pt went to visit his mother today who checked his BP and glucose and noted that glucose was >600 and SBP >200  He has no history of HTN/DM  He reports that CP is intermittent, not currently present  Reports occasional SOB/HANDY, tried working out the other day and became very short of breath with just warming up  Also reports area of swelling to angle of L mandible, present the past few days, not painful  He does report pain with swallowing described as razor blades  Prior to Admission Medications   Prescriptions Last Dose Informant Patient Reported? Taking?   acetaminophen (TYLENOL) 650 mg CR tablet Not Taking at Unknown time  No No   Sig: Take 1 tablet (650 mg total) by mouth every 8 (eight) hours as needed for mild pain   Patient not taking: Reported on 4/17/2022    albuterol (PROVENTIL HFA,VENTOLIN HFA) 90 mcg/act inhaler Not Taking at Unknown time  No No   Sig: Inhale 2 puffs every 6 (six) hours as needed for wheezing   Patient not taking: Reported on 4/17/2022       Facility-Administered Medications: None       Past Medical History:   Diagnosis Date    Asthma     Hepatitis C        Past Surgical History:   Procedure Laterality Date    BACK SURGERY  2010    LUMBAR PUNCTURE         History reviewed  No pertinent family history  I have reviewed and agree with the history as documented      E-Cigarette/Vaping     E-Cigarette/Vaping Substances     Social History     Tobacco Use    Smoking status: Current Every Day Smoker     Packs/day: 0 50     Types: Cigarettes    Smokeless tobacco: Never Used   Substance Use Topics    Alcohol use: Yes     Comment: "occasionally"    Drug use: Yes     Types: Heroin, Marijuana     Comment: hx of heroin       Review of Systems   Constitutional: Negative for chills, fever and unexpected weight change  HENT: Negative for ear pain, rhinorrhea and sore throat  Eyes: Negative for pain and visual disturbance  Respiratory: Positive for shortness of breath  Negative for cough  Cardiovascular: Positive for chest pain  Negative for leg swelling  Gastrointestinal: Negative for abdominal pain, constipation, diarrhea, nausea and vomiting  Endocrine: Positive for polydipsia and polyuria  Genitourinary: Negative for dysuria, frequency, hematuria and urgency  Musculoskeletal: Negative for back pain, myalgias and neck pain  Skin: Negative for color change and rash  Allergic/Immunologic: Negative for environmental allergies and immunocompromised state  Neurological: Negative for dizziness, weakness, light-headedness, numbness and headaches  Hematological: Negative for adenopathy  Does not bruise/bleed easily  Psychiatric/Behavioral: Negative for agitation and confusion  All other systems reviewed and are negative  Physical Exam  Physical Exam  Vitals and nursing note reviewed  Constitutional:       Appearance: He is well-developed  He is not ill-appearing  HENT:      Head: Normocephalic and atraumatic  Nose: Nose normal    Eyes:      Conjunctiva/sclera: Conjunctivae normal    Cardiovascular:      Rate and Rhythm: Regular rhythm  Tachycardia present  Heart sounds: Normal heart sounds  Pulmonary:      Effort: Pulmonary effort is normal  No respiratory distress  Breath sounds: Normal breath sounds  No stridor  No wheezing or rales  Chest:      Chest wall: No tenderness  Abdominal:      General: There is no distension  Palpations: Abdomen is soft  Tenderness: There is no abdominal tenderness  There is no guarding or rebound  Musculoskeletal:         General: No swelling, tenderness or deformity  Cervical back: Normal range of motion and neck supple  Skin:     General: Skin is warm and dry  Findings: No rash  Neurological:      General: No focal deficit present  Mental Status: He is alert and oriented to person, place, and time  Motor: No abnormal muscle tone  Coordination: Coordination normal    Psychiatric:         Thought Content:  Thought content normal          Judgment: Judgment normal          Vital Signs  ED Triage Vitals   Temperature Pulse Respirations Blood Pressure SpO2   04/16/22 2108 04/16/22 2108 04/16/22 2108 04/16/22 2108 04/16/22 2108   98 5 °F (36 9 °C) (!) 116 16 (!) 191/123 99 %      Temp Source Heart Rate Source Patient Position - Orthostatic VS BP Location FiO2 (%)   04/16/22 2108 04/16/22 2108 04/16/22 2243 04/16/22 2243 --   Oral Monitor Lying Left arm       Pain Score       --                  Vitals:    04/16/22 2108 04/16/22 2243 04/17/22 0019   BP: (!) 191/123 137/83 145/88   Pulse: (!) 116 87 90   Patient Position - Orthostatic VS:  Lying Lying         Visual Acuity      ED Medications  Medications   metFORMIN (GLUCOPHAGE) tablet 500 mg (has no administration in time range)   lactated ringers bolus 2,000 mL (0 mL Intravenous Stopped 4/17/22 0123)       Diagnostic Studies  Results Reviewed     Procedure Component Value Units Date/Time    HS Troponin I 2hr [[id number]]  (Normal) Collected: 04/17/22 0021    Lab Status: Final result Specimen: Blood from Arm, Right Updated: 04/17/22 0115     hs TnI 2hr 3 ng/L      Delta 2hr hsTnI >1 ng/L     Urine Microscopic [[id number]]  (Abnormal) Collected: 04/17/22 0023    Lab Status: Final result Specimen: Urine, Clean Catch Updated: 04/17/22 0108     RBC, UA 0-1 /hpf      WBC, UA 0-1 /hpf      Epithelial Cells Occasional /hpf      Bacteria, UA None Seen /hpf     Urine Macroscopic, POC [[id number]]  (Abnormal) Collected: 04/17/22 0023    Lab Status: Final result Specimen: Urine Updated: 04/17/22 0024     Color, UA Yellow     Clarity, UA Clear     pH, UA 6 5     Leukocytes, UA Elevated glucose may cause decreased leukocyte values   See urine microscopic for [facility] result/     Nitrite, UA Negative     Protein, UA Negative mg/dl      Glucose, UA >=1000 (1%) mg/dl      Ketones, UA Negative mg/dl      Urobilinogen, UA 0 2 E U /dl      Bilirubin, UA Negative     Blood, UA Negative     Specific Gravity, UA 1 020    Narrative:      CLINITEK RESULT    Strep A PCR [[id number]]  (Normal) Collected: 04/16/22 2214    Lab Status: Final result Specimen: Throat Updated: 04/16/22 2303     STREP A PCR Not Detected    HS Troponin 0hr (reflex protocol) [[id number]]  (Normal) Collected: 04/16/22 2214    Lab Status: Final result Specimen: Blood from Arm, Right Updated: 04/16/22 2242     hs TnI 0hr <2 ng/L     Basic metabolic panel [[id number]]  (Abnormal) Collected: 04/16/22 2214    Lab Status: Final result Specimen: Blood from Arm, Right Updated: 04/16/22 2236     Sodium 130 mmol/L      Potassium 4 0 mmol/L      Chloride 93 mmol/L      CO2 27 mmol/L      ANION GAP 10 mmol/L      BUN 12 mg/dL      Creatinine 0 80 mg/dL      Glucose 439 mg/dL      Calcium 9 3 mg/dL      eGFR 109 ml/min/1 73sq m     Narrative:      [facility] guidelines for Chronic Kidney Disease (CKD):     Stage 1 with normal or high GFR (GFR > 90 mL/min/1 73 square meters)    Stage 2 Mild CKD (GFR = 60-89 mL/min/1 73 square meters)    Stage 3A Moderate CKD (GFR = 45-59 mL/min/1 73 square meters)    Stage 3B Moderate CKD (GFR = 30-44 mL/min/1 73 square meters)    Stage 4 Severe CKD (GFR = 15-29 mL/min/1 73 square meters)    Stage 5 End Stage CKD (GFR <15 mL/min/1 73 square meters)  Note: GFR calculation is accurate only with a steady state creatinine    Lipase [[id number]]  (Normal) Collected: 04/16/22 2214    Lab Status: Final result Specimen: Blood from Arm, Right Updated: 04/16/22 2236     Lipase 113 u/L     Hepatic function panel [[id number]]  (Abnormal) Collected: 04/16/22 2214    Lab Status: Final result Specimen: Blood from Arm, Right Updated: 04/16/22 2236     Total Bilirubin 0 48 mg/dL      Bilirubin, Direct 0 15 mg/dL      Alkaline Phosphatase 206 U/L      AST 71 U/L       U/L      Total Protein 8 6 g/dL      Albumin 3 9 g/dL     Blood gas, venous [[id number]]  (Abnormal) Collected: 04/16/22 2214    Lab Status: Final result Specimen: Blood from Arm, Right Updated: 04/16/22 2221     pH, Guillermo 7 389     pCO2, Guillermo 45 3 mm Hg      pO2, Guillermo 45 4 mm Hg      HCO3, Guillermo 26 7 mmol/L      Base Excess, Guillermo 1 2 mmol/L      O2 Content, Guillermo 19 0 ml/dL      O2 HGB, VENOUS 79 2 %     CBC and differential [[id number]] Collected: 04/16/22 2214    Lab Status: Final result Specimen: Blood from Arm, Right Updated: 04/16/22 2221     WBC 9 54 Thousand/uL      RBC 5 53 Million/uL      Hemoglobin 16 6 g/dL      Hematocrit 45 8 %      MCV 83 fL      MCH 30 0 pg      MCHC 36 2 g/dL      RDW 11 8 %      MPV 11 4 fL      Platelets 142 Thousands/uL      nRBC 0 /100 WBCs      Neutrophils Relative 60 %      Immat GRANS % 0 %      Lymphocytes Relative 27 %      Monocytes Relative 7 %      Eosinophils Relative 5 %      Basophils Relative 1 %      Neutrophils Absolute 5 77 Thousands/µL      Immature Grans Absolute 0 04 Thousand/uL      Lymphocytes Absolute 2 57 Thousands/µL      Monocytes Absolute 0 64 Thousand/µL      Eosinophils Absolute 0 43 Thousand/µL      Basophils Absolute 0 09 Thousands/µL     Mononucleosis screen [[id number]] Collected: 04/16/22 2214    Lab Status:  In process Specimen: Blood from Arm, Right Updated: 04/16/22 2218    Fingerstick Glucose (POCT) [[id number]]  (Abnormal) Collected: 04/16/22 2131    Lab Status: Final result Updated: 04/16/22 2133     POC Glucose >500 mg/dl                  XR chest 2 views    (Results Pending)              Procedures  Procedures ED Course  ED Course as of 04/17/22 0159   Sat Apr 16, 2022   2139 POC Glucose(!!): >500   2201 Difficulty obtaining access/labs   2219 CXR interpreted by myself: no acute abn   2229 pH, Guillermo: 7 389   2301 AST(!): 71   2302 ALT(!): 133   2302 Alkaline Phosphatase(!): 206   2302 Glucose, Random(!): 439   2308 EKG @ 21:25: sinus tachycardia @ 110 bpm, LAD, normal intervals, poor R wave progression in anterior leads, no ST changes   2323 Pt reassessed  He reports having episode of sharp CP currently, repeat EKG performed: sinus @ 90 bpm, LAD, normal intervals, no ST changes   2324 I discussed labs with pt  He states history of elevated LFTs in the past, known Hepatitis C never treated  States no drug use for the past 6 months  Admits to occasional ETOH     2328 Plan is for repeat troponin  DC with Metformin, PCP f/u, GI f/u   Sun Apr 17, 2022 0027 Repeat EKG: NSR @ 90 bpm, LAD, normal intervals, poor r wave progression ant leavs, no ST changes, unchanged from prior   0117 Delta 2hr hsTnI: >1   0134 Reviewed importance of outpt f/u  Will start on metformin  Placed ambulatory referral to family practice to ensure close f/u             HEART Risk Score      Most Recent Value   Heart Score Risk Calculator    History 0 Filed at: 04/17/2022 0028   ECG 1 Filed at: 04/17/2022 0028   Age 0 Filed at: 04/17/2022 0028   Risk Factors 1 Filed at: 04/17/2022 0028   Troponin 0 Filed at: 04/17/2022 0028   HEART Score 2 Filed at: 04/17/2022 0028                                      MDM  Number of Diagnoses or Management Options  Diabetes ([address] )  Enlarged lymph node in neck  Hyperglycemia  Intermittent chest pain  Transaminitis  Diagnosis management comments: 35 yo M with polyuria/polydipsia, new onset DM with hyperglycemia  No DKA  Given IVF and started on Metformin  Discussed return precautions  Instructed importance of PCP f/u and I placed ambulatory referral to family practice    Transaminitis with h/o untreated Hep C  Instructed to avoid ETOH and APAP and to f/u with PCP and GI         Amount and/or Complexity of Data Reviewed  Clinical lab tests: ordered and reviewed  Tests in the radiology section of CPT®: ordered and reviewed  Tests in the medicine section of CPT®: ordered and reviewed  Review and summarize past medical records: yes  Independent visualization of images, tracings, or specimens: yes        Disposition  Final diagnoses:   Diabetes ([address] )   Transaminitis   Enlarged lymph node in neck   Hyperglycemia   Intermittent chest pain     Time reflects when diagnosis was documented in both MDM as applicable and the Disposition within this note     Time User Action Codes Description Comment    4/16/2022 11:25 PM Slade Fluke A Add [E11 9] Diabetes ([address] )     4/16/2022 11:25 PM Slade Fluke A Add [R74 01] Transaminitis     4/16/2022 11:26 PM Romie Fluke A Add [R59 0] Enlarged lymph node in neck     4/17/2022  1:25 AM Romie Fluke A Add [R73 9] Hyperglycemia     4/17/2022  1:25 AM Slade Fluke A Add [R07 9] Intermittent chest pain       ED Disposition     ED Disposition Condition Date/Time Comment    Discharge Stable Sun Apr 17, 2022  1:22 AM Corinna Kain discharge to home/self care              Follow-up Information     Follow up With Specialties Details Why Contact Info Additional [address]   [address], [address] [facility] [address]  [address], [address], [address] [place], [place], [address] [facility]   [address]  [address] [facility] [address]  Yenny Garcia [address] [facility] [place], [address], [address], [facility], [place], [address] [phone]          Patient's Medications   Discharge Prescriptions    METFORMIN (GLUCOPHAGE) 500 MG TABLET    Take 1 tablet (500 mg total) by mouth 2 (two) times a day with meals for 21 days       Start Date: 4/17/2022 End Date: 5/8/2022       Order Dose: 500 mg       Quantity: 42 tablet    Refills: 0           PDMP Review     None          ED Provider  Electronically Signed by           Emily Lucero DO  04/17/22 0159

## 2022-04-18 LAB — HETEROPH AB SER QL: NEGATIVE

## 2022-05-12 ENCOUNTER — OFFICE VISIT (OUTPATIENT)
Dept: URGENT CARE | Facility: MEDICAL CENTER | Age: 42
End: 2022-05-12
Payer: COMMERCIAL

## 2022-05-12 VITALS
RESPIRATION RATE: 20 BRPM | HEART RATE: 116 BPM | OXYGEN SATURATION: 95 % | HEIGHT: 70 IN | SYSTOLIC BLOOD PRESSURE: 130 MMHG | BODY MASS INDEX: 43.23 KG/M2 | DIASTOLIC BLOOD PRESSURE: 82 MMHG | WEIGHT: 302 LBS | TEMPERATURE: 98.4 F

## 2022-05-12 DIAGNOSIS — S89.90XA KNEE INJURY, INITIAL ENCOUNTER: Primary | ICD-10-CM

## 2022-05-12 DIAGNOSIS — M25.462 KNEE EFFUSION, LEFT: ICD-10-CM

## 2022-05-12 PROCEDURE — 99213 OFFICE O/P EST LOW 20 MIN: CPT | Performed by: PHYSICIAN ASSISTANT

## 2022-05-12 RX ORDER — LISINOPRIL 20 MG/1
20 TABLET ORAL DAILY
COMMUNITY
Start: 2022-04-21

## 2022-05-12 RX ORDER — IBUPROFEN 800 MG/1
800 TABLET ORAL EVERY 8 HOURS PRN
Qty: 30 TABLET | Refills: 0 | Status: SHIPPED | OUTPATIENT
Start: 2022-05-12

## 2022-05-12 NOTE — PATIENT INSTRUCTIONS
Continue rest, ice, elevation, ice, and compression  ibuprofen as prescribed 3 times daily for the next 5-7 days  Supplement with Tylenol 1000 mg every 8 hours as needed alternating every 4 hours with ibuprofen  Hinged knee brace to the left knee for the next week  Follow up with Orthopedics as soon as possible  if symptoms worsen and the symptoms developed particularly difficulty with ambulation or inability to ambulate reports the emergency room  Knee Pain   AMBULATORY CARE:   Knee pain  may start suddenly, or it may be a long-term problem  You may have pain on the side, front, or back of your knee  You may have knee stiffness and swelling  You may hear popping sounds or feel like your knee is giving way or locking up as you walk  You may feel pain when you sit, stand, walk, or climb up and down stairs  Knee pain can be caused by conditions such as obesity, inflammation, or strains or tears in ligaments or tendons  Seek care immediately if:   Your pain is worse, even after treatment  You cannot bend or straighten your leg completely  The swelling around your knee does not go down even with treatment  Your knee is painful and hot to the touch  Contact your healthcare provider if:   You have questions or concerns about your condition or care  Treatment  will depend on the cause of your pain  You may need any of the following:  NSAIDs  help decrease swelling and pain or fever  This medicine is available with or without a doctor's order  NSAIDs can cause stomach bleeding or kidney problems in certain people  If you take blood thinner medicine, always ask your healthcare provider if NSAIDs are safe for you  Always read the medicine label and follow directions  Acetaminophen  decreases pain and fever  It is available without a doctor's order  Ask how much to take and how often to take it  Follow directions   Read the labels of all other medicines you are using to see if they also contain acetaminophen, or ask your doctor or pharmacist  Acetaminophen can cause liver damage if not taken correctly  Do not use more than 4 grams (4,000 milligrams) total of acetaminophen in one day  Prescription pain medicine  may be given  Ask your healthcare provider how to take this medicine safely  Some prescription pain medicines contain acetaminophen  Do not take other medicines that contain acetaminophen without talking to your healthcare provider  Too much acetaminophen may cause liver damage  Prescription pain medicine may cause constipation  Ask your healthcare provider how to prevent or treat constipation  Steroid injections  may be given into your knee  Steroids reduce inflammation and pain  Surgery  may be used for some injuries, such as to repair a torn ACL  What you can do to manage your symptoms:   Rest your knee so it can heal   Limit activities that increase your pain  Do low-impact exercises, such as walking or swimming  Apply ice to help reduce swelling and pain  Use an ice pack, or put crushed ice in a plastic bag  Cover it with a towel before you apply it to your knee  Apply ice for 15 to 20 minutes every hour, or as directed  Apply compression to help reduce swelling  Use a brace or bandage only as directed  Elevate your knee to help decrease pain and swelling  Elevate your knee while you are sitting or lying down  Prop your leg on pillows to keep your knee above the level of your heart  Prevent your knee from moving as directed  Your healthcare provider may put on a cast or splint  You may need to wear a leg brace to stabilize your knee  A leg brace can be adjusted to increase your range of motion as your knee heals  What you can do to prevent knee pain:   Maintain a healthy weight  Extra weight increases your risk for knee pain  Ask your healthcare provider how much you should weigh   He or she can help you create a safe weight loss plan if you need to lose weight  Exercise or train properly  Use the correct equipment for sports  Wear shoes that provide good support  Check your posture often as you exercise, play sports, or train for an event  This can help prevent stress and strain on your knees  Rest between sessions so you do not overwork your knees  Follow up with your healthcare provider within 24 hours or as directed:  Write down your questions so you remember to ask them during your visits  © Copyright [employer] 2022 Information is for End User's use only and may not be sold, redistributed or otherwise used for commercial purposes  All illustrations and images included in CareNotes® are the copyrighted property of [employer]  or [address] [place], [place] [address]   The above information is an  only  It is not intended as medical advice for individual conditions or treatments  Talk to your doctor, nurse or pharmacist before following any medical regimen to see if it is safe and effective for you

## 2022-05-12 NOTE — PROGRESS NOTES
[facility]        NAME: Corinna Finnegan is a 36 y o  male  : 1980    MRN: [id number]  DATE: May 12, 2022  TIME: 6:21 PM    Assessment and Plan   Knee injury, initial encounter [S89 90XA]  1  Knee injury, initial encounter  ibuprofen (MOTRIN) 800 mg tablet    Ambulatory Referral to Orthopedic Surgery   2  Knee effusion, left  Ambulatory Referral to Orthopedic Surgery   Patient presents with acute left knee pain in the effusion injury to the knee  After buckling injury to the knee  Patient is tender to palpation over the medial tibial plateau and femoral condyle I recommend x-ray to evaluate further but patient declines this time  He is instructed consistent 10 you conservative measures and is provided with a prescription for ibuprofen and instructed in Tylenol use  Patient states he has a hinged knee brace at home and I have instructed him to start wearing this for the next week  Patient is provided for note for work until Monday  Patient is also provided with a referral to Orthopedics  He is instructed follow-up with them as soon as possible  Patient reports emergency room if symptoms worsen or or he develops inability to bear weight  Patient Instructions     Patient Instructions     Continue rest, ice, elevation, ice, and compression  ibuprofen as prescribed 3 times daily for the next 5-7 days  Supplement with Tylenol 1000 mg every 8 hours as needed alternating every 4 hours with ibuprofen  Hinged knee brace to the left knee for the next week  Follow up with Orthopedics as soon as possible  if symptoms worsen and the symptoms developed particularly difficulty with ambulation or inability to ambulate reports the emergency room  Knee Pain   AMBULATORY CARE:   Knee pain  may start suddenly, or it may be a long-term problem  You may have pain on the side, front, or back of your knee  You may have knee stiffness and swelling   You may hear popping sounds or feel like your knee is giving way or locking up as you walk  You may feel pain when you sit, stand, walk, or climb up and down stairs  Knee pain can be caused by conditions such as obesity, inflammation, or strains or tears in ligaments or tendons  Seek care immediately if:   · Your pain is worse, even after treatment  · You cannot bend or straighten your leg completely  · The swelling around your knee does not go down even with treatment  · Your knee is painful and hot to the touch  Contact your healthcare provider if:   · You have questions or concerns about your condition or care  Treatment  will depend on the cause of your pain  You may need any of the following:  · NSAIDs  help decrease swelling and pain or fever  This medicine is available with or without a doctor's order  NSAIDs can cause stomach bleeding or kidney problems in certain people  If you take blood thinner medicine, always ask your healthcare provider if NSAIDs are safe for you  Always read the medicine label and follow directions  · Acetaminophen  decreases pain and fever  It is available without a doctor's order  Ask how much to take and how often to take it  Follow directions  Read the labels of all other medicines you are using to see if they also contain acetaminophen, or ask your doctor or pharmacist  Acetaminophen can cause liver damage if not taken correctly  Do not use more than 4 grams (4,000 milligrams) total of acetaminophen in one day  · Prescription pain medicine  may be given  Ask your healthcare provider how to take this medicine safely  Some prescription pain medicines contain acetaminophen  Do not take other medicines that contain acetaminophen without talking to your healthcare provider  Too much acetaminophen may cause liver damage  Prescription pain medicine may cause constipation  Ask your healthcare provider how to prevent or treat constipation  · Steroid injections  may be given into your knee   Steroids reduce inflammation and pain  · Surgery  may be used for some injuries, such as to repair a torn ACL  What you can do to manage your symptoms:   · Rest your knee so it can heal   Limit activities that increase your pain  Do low-impact exercises, such as walking or swimming  · Apply ice to help reduce swelling and pain  Use an ice pack, or put crushed ice in a plastic bag  Cover it with a towel before you apply it to your knee  Apply ice for 15 to 20 minutes every hour, or as directed  · Apply compression to help reduce swelling  Use a brace or bandage only as directed  · Elevate your knee to help decrease pain and swelling  Elevate your knee while you are sitting or lying down  Prop your leg on pillows to keep your knee above the level of your heart  · Prevent your knee from moving as directed  Your healthcare provider may put on a cast or splint  You may need to wear a leg brace to stabilize your knee  A leg brace can be adjusted to increase your range of motion as your knee heals  What you can do to prevent knee pain:   · Maintain a healthy weight  Extra weight increases your risk for knee pain  Ask your healthcare provider how much you should weigh  He or she can help you create a safe weight loss plan if you need to lose weight  · Exercise or train properly  Use the correct equipment for sports  Wear shoes that provide good support  Check your posture often as you exercise, play sports, or train for an event  This can help prevent stress and strain on your knees  Rest between sessions so you do not overwork your knees  Follow up with your healthcare provider within 24 hours or as directed:  Write down your questions so you remember to ask them during your visits  © Copyright [employer] 2022 Information is for End User's use only and may not be sold, redistributed or otherwise used for commercial purposes   All illustrations and images included in CareNotes® are the copyrighted property of [employer]  or [address]   The above information is an  only  It is not intended as medical advice for individual conditions or treatments  Talk to your doctor, nurse or pharmacist before following any medical regimen to see if it is safe and effective for you  Follow up with PCP in 3-5 days  Proceed to  ER if symptoms worsen  Chief Complaint     Chief Complaint   Patient presents with    Knee Pain     Left knee pain x today when he fell and his knee "buckled " Complaining of pain to lateral and posterior knee  States this is a re-injury of 17 years ago  Admits needing surgery that he continues to postpone  History of Present Illness       36year old male presents with complaint of     Knee Pain   The incident occurred 6 to 12 hours ago  The incident occurred at home  Injury mechanism: pt slipped down the steps and knee buckled, reports prior injury approximately 15 years ago  Has had issues with the knee on and off since that time  The pain is present in the left knee  The quality of the pain is described as stabbing  The pain is at a severity of 8/10  The pain is severe (Pain is worse over the lateral aspect of the knee)  The pain has been fluctuating since onset  Associated symptoms include a loss of motion  Pertinent negatives include no inability to bear weight (but painful to weight bear, using cane), loss of sensation, muscle weakness, numbness or tingling  The symptoms are aggravated by weight bearing and movement  He has tried ice, NSAIDs and immobilization (compression) for the symptoms  The treatment provided mild relief  Patient reports he had these symptoms in the past several times  He states that it takes about 3-4 days for to calm down and be able to function normally  He does not feel like as any fractures this time and does not want an x-ray  No other concerns or complaints today      Review of Systems   Review of Systems Constitutional: Negative for chills and fever  Musculoskeletal: Positive for arthralgias, gait problem and joint swelling  Neurological: Negative for tingling and numbness  Current Medications       Current Outpatient Medications:     ibuprofen (MOTRIN) 800 mg tablet, Take 1 tablet (800 mg total) by mouth every 8 (eight) hours as needed for mild pain, Disp: 30 tablet, Rfl: 0    lisinopril (ZESTRIL) 20 mg tablet, Take 20 mg by mouth in the morning , Disp: , Rfl:     acetaminophen (TYLENOL) 650 mg CR tablet, Take 1 tablet (650 mg total) by mouth every 8 (eight) hours as needed for mild pain (Patient not taking: Reported on 4/17/2022 ), Disp: 30 tablet, Rfl: 0    albuterol (PROVENTIL HFA,VENTOLIN HFA) 90 mcg/act inhaler, Inhale 2 puffs every 6 (six) hours as needed for wheezing (Patient not taking: Reported on 4/17/2022 ), Disp: 6 7 g, Rfl: 0    metFORMIN (GLUCOPHAGE) 500 mg tablet, Take 1 tablet (500 mg total) by mouth 2 (two) times a day with meals for 21 days, Disp: 42 tablet, Rfl: 0    Current Allergies     Allergies as of 05/12/2022 - Reviewed 05/12/2022   Allergen Reaction Noted    Aspirin Hives 10/28/2016            The following portions of the patient's history were reviewed and updated as appropriate: allergies, current medications, past family history, past medical history, past social history, past surgical history and problem list      Past Medical History:   Diagnosis Date    Asthma     Hepatitis C        Past Surgical History:   Procedure Laterality Date    BACK SURGERY  2010    LUMBAR PUNCTURE         No family history on file  Medications have been verified  Objective   /82   Pulse (!) 116   Temp 98 4 °F (36 9 °C)   Resp 20   Ht 5' 10" (1 778 m)   Wt (!) 137 kg (302 lb)   SpO2 95%   BMI 43 33 kg/m²   No LMP for male patient  Physical Exam     Physical Exam  Vitals and nursing note reviewed  Constitutional:       General: He is awake   He is not in acute distress  Appearance: Normal appearance  He is well-developed and well-groomed  He is not ill-appearing, toxic-appearing or diaphoretic  HENT:      Head: Normocephalic and atraumatic  Right Ear: Hearing and external ear normal       Left Ear: Hearing and external ear normal    Eyes:      General: Lids are normal  Vision grossly intact  Gaze aligned appropriately  Cardiovascular:      Rate and Rhythm: Normal rate  Pulmonary:      Effort: Pulmonary effort is normal       Comments: Patient is speaking in full sentences with no increased respiratory effort  No audible wheezing or stridor  Musculoskeletal:      Cervical back: Normal range of motion  Right knee: Normal       Left knee: Swelling, effusion (2+) and bony tenderness ( on the medial side to the tibial plateau and femoral condyle) present  No deformity, erythema, ecchymosis, lacerations or crepitus  Decreased range of motion (Secondary to pain and swelling patient able to extend to 0° will flex to up to 30° but this is very painful  )  Tenderness present over the medial joint line, lateral joint line and MCL  No LCL, ACL, PCL or patellar tendon tenderness  LCL laxity present  No MCL laxity, ACL laxity or PCL laxity  Abnormal meniscus  Normal alignment and normal patellar mobility  Normal pulse  Skin:     General: Skin is warm and dry  Neurological:      Mental Status: He is alert and oriented to person, place, and time  Coordination: Coordination is intact  Gait: Gait abnormal ( antalgic gait ambulating with cane)  Psychiatric:         Attention and Perception: Attention and perception normal          Mood and Affect: Mood and affect normal          Speech: Speech normal          Behavior: Behavior normal  Behavior is cooperative  Note: Portions of this record may have been created with voice recognition software   Occasional wrong word or "sound a like" substitutions may have occurred due to the inherent limitations of voice recognition software  Please read the chart carefully and recognize, using context, where substitutions have occurred  *

## 2022-11-10 NOTE — LETTER
May 12, 2022     Patient: Mervin Kate   YOB: 1980   Date of Visit: 5/12/2022       To Whom It May Concern: It is my medical opinion that Mervin Kate should remain out of work until 05/16/2022  If you have any questions or concerns, please don't hesitate to call           Sincerely,        Yajaira Marte PA-C    CC: No Recipients Partial Purse String (Simple) Text: Given the location of the defect and the characteristics of the surrounding skin a simple purse string closure was deemed most appropriate.  Undermining was performed circumfirentially around the surgical defect.  A purse string suture was then placed and tightened. Wound tension only allowed a partial closure of the circular defect.

## 2023-11-16 ENCOUNTER — APPOINTMENT (OUTPATIENT)
Dept: RADIOLOGY | Facility: MEDICAL CENTER | Age: 43
End: 2023-11-16
Payer: OTHER MISCELLANEOUS

## 2023-11-16 ENCOUNTER — OCCMED (OUTPATIENT)
Dept: URGENT CARE | Facility: MEDICAL CENTER | Age: 43
End: 2023-11-16
Payer: OTHER MISCELLANEOUS

## 2023-11-16 DIAGNOSIS — R07.81 RIB PAIN ON RIGHT SIDE: ICD-10-CM

## 2023-11-16 DIAGNOSIS — T14.8XXA CRUSH INJURY: ICD-10-CM

## 2023-11-16 DIAGNOSIS — R07.81 RIB PAIN ON RIGHT SIDE: Primary | ICD-10-CM

## 2023-11-16 PROCEDURE — G0382 LEV 3 HOSP TYPE B ED VISIT: HCPCS | Performed by: NURSE PRACTITIONER

## 2023-11-16 PROCEDURE — 99283 EMERGENCY DEPT VISIT LOW MDM: CPT | Performed by: NURSE PRACTITIONER

## 2023-11-16 PROCEDURE — 71101 X-RAY EXAM UNILAT RIBS/CHEST: CPT

## 2023-11-20 ENCOUNTER — OCCMED (OUTPATIENT)
Dept: URGENT CARE | Facility: MEDICAL CENTER | Age: 43
End: 2023-11-20
Payer: OTHER MISCELLANEOUS

## 2023-11-20 DIAGNOSIS — R07.81 RIB PAIN: ICD-10-CM

## 2023-11-20 DIAGNOSIS — R07.81 PLEURODYNIA: ICD-10-CM

## 2023-11-20 DIAGNOSIS — S20.219D STERNAL CONTUSION, SUBSEQUENT ENCOUNTER: Primary | ICD-10-CM

## 2023-11-20 DIAGNOSIS — S20.20XD CONTUSION, MULTIPLE SITES, TRUNK, SUBSEQUENT ENCOUNTER: ICD-10-CM

## 2023-11-20 PROCEDURE — 99213 OFFICE O/P EST LOW 20 MIN: CPT | Performed by: NURSE PRACTITIONER

## 2024-09-11 ENCOUNTER — HOSPITAL ENCOUNTER (EMERGENCY)
Facility: HOSPITAL | Age: 44
Discharge: HOME | DRG: 773 | End: 2024-09-11
Payer: COMMERCIAL

## 2024-09-11 VITALS
TEMPERATURE: 97.8 F | DIASTOLIC BLOOD PRESSURE: 108 MMHG | HEART RATE: 65 BPM | RESPIRATION RATE: 18 BRPM | SYSTOLIC BLOOD PRESSURE: 167 MMHG | OXYGEN SATURATION: 98 %

## 2024-09-11 DIAGNOSIS — R03.0 ELEVATED BLOOD PRESSURE READING: Primary | ICD-10-CM

## 2024-09-11 LAB
ATRIAL RATE: 57 BPM
P AXIS: 31 DEGREES
PR INTERVAL: 186 MS
QRS AXIS: -61 DEGREES
QRSD INTERVAL: 92 MS
QT INTERVAL: 422 MS
QTC INTERVAL: 410 MS
T WAVE AXIS: -12 DEGREES
VENTRICULAR RATE: 57 BPM

## 2024-09-11 PROCEDURE — 93010 ELECTROCARDIOGRAM REPORT: CPT | Performed by: INTERNAL MEDICINE

## 2024-09-11 PROCEDURE — 99283 EMERGENCY DEPT VISIT LOW MDM: CPT

## 2024-09-11 PROCEDURE — 93005 ELECTROCARDIOGRAM TRACING: CPT

## 2024-09-11 PROCEDURE — 99283 EMERGENCY DEPT VISIT LOW MDM: CPT | Performed by: EMERGENCY MEDICINE

## 2024-09-11 NOTE — ED PROVIDER NOTES
1. Elevated blood pressure reading      ED Disposition       ED Disposition   Discharge    Condition   --    Date/Time   Wed Sep 11, 2024  2:26 AM    Comment   Bryant Lazcano discharge to home/self care.                   Assessment & Plan       Medical Decision Making  44 male past medical history hypertension, heroin use, presents ED for evaluation of hypertension.  Patient has a dull headache but is otherwise asymptomatic.  On exam, /108.  He has a nonfocal neurologic exam.  Heart regular rate and rhythm.  Lungs clear to station bilaterally.  Plan: EKG.    Reassessment: EKG without infarctive changes.  Will discharge to home.  Patient stable at time of discharge.  I instructed the patient to follow-up with his primary care physician regarding his blood pressure.  Return precautions discussed with patient if symptoms worsen.                     Medications - No data to display    History of Present Illness       44-year-old male presents ED for evaluation of elevated blood pressure reading.  Patient arrives with his wife who states22-year-old male presents ED for evaluation of oral bleeding.  Patient had a bilateral tonsillectomy on 9/6 by Dr. Mittal.  He states that over the past day, his scabs fell off and he now has bleeding in the back of his throat with clots.  He has been spitting blood and clots into an emesis bag.  Denies shortness of breath.  Patient does not take any anticoagulation or antiplatelet medications.  The patient had an elevated blood pressure reading today.  It was taken manually at home.  Patient states that he has had a dull headache, but denies vision changes, dizziness, lightheadedness, focal weakness or numbness, chest pain, shortness of breath, nausea, vomiting.  He states that he took double his home dose of lisinopril today.  He states that yesterday, he took lisinopril then injected heroin.  Wife states that he was unarousable for a short time after that.  She did not give the  patient Narcan at that time.          Review of Systems   Constitutional:  Negative for fever.   Eyes:  Negative for photophobia and visual disturbance.   Respiratory:  Negative for shortness of breath.    Cardiovascular:  Negative for chest pain.   Gastrointestinal:  Negative for abdominal pain and vomiting.   Musculoskeletal:  Negative for back pain and neck pain.   Neurological:  Positive for headaches. Negative for dizziness, light-headedness and numbness.   Psychiatric/Behavioral:  Negative for behavioral problems and confusion.            Objective     ED Triage Vitals [09/11/24 0220]   Temperature Pulse Blood Pressure Respirations SpO2 Patient Position - Orthostatic VS   97.8 °F (36.6 °C) 65 (!) 167/108 18 98 % Lying      Temp Source Heart Rate Source BP Location FiO2 (%) Pain Score    Oral Monitor Right arm -- --        Physical Exam  Vitals and nursing note reviewed.   Constitutional:       General: He is not in acute distress.     Appearance: Normal appearance. He is normal weight. He is not ill-appearing, toxic-appearing or diaphoretic.   HENT:      Head: Normocephalic and atraumatic.      Nose: No rhinorrhea.      Mouth/Throat:      Mouth: Mucous membranes are moist.      Pharynx: Oropharynx is clear.   Eyes:      Extraocular Movements: Extraocular movements intact.      Conjunctiva/sclera: Conjunctivae normal.      Pupils: Pupils are equal, round, and reactive to light.   Cardiovascular:      Rate and Rhythm: Normal rate and regular rhythm.      Pulses: Normal pulses.      Heart sounds: Normal heart sounds.   Pulmonary:      Effort: Pulmonary effort is normal.      Breath sounds: Normal breath sounds.   Abdominal:      General: Abdomen is flat.      Palpations: Abdomen is soft.      Tenderness: There is no abdominal tenderness.   Musculoskeletal:      Cervical back: Neck supple. No tenderness.   Skin:     General: Skin is warm and dry.      Capillary Refill: Capillary refill takes less than 2 seconds.    Neurological:      General: No focal deficit present.      Mental Status: He is alert and oriented to person, place, and time.      Cranial Nerves: No cranial nerve deficit.      Sensory: No sensory deficit.      Motor: No weakness.   Psychiatric:         Mood and Affect: Mood normal.         Behavior: Behavior normal.         Labs Reviewed - No data to display  No orders to display       Procedures         Asher Paul DO  09/11/24 0356

## 2024-09-13 ENCOUNTER — APPOINTMENT (EMERGENCY)
Dept: RADIOLOGY | Facility: HOSPITAL | Age: 44
DRG: 773 | End: 2024-09-13
Payer: COMMERCIAL

## 2024-09-13 ENCOUNTER — APPOINTMENT (INPATIENT)
Dept: RADIOLOGY | Facility: HOSPITAL | Age: 44
DRG: 773 | End: 2024-09-13
Payer: COMMERCIAL

## 2024-09-13 ENCOUNTER — HOSPITAL ENCOUNTER (INPATIENT)
Facility: HOSPITAL | Age: 44
LOS: 4 days | Discharge: HOME | DRG: 773 | End: 2024-09-17
Attending: EMERGENCY MEDICINE | Admitting: INTERNAL MEDICINE
Payer: COMMERCIAL

## 2024-09-13 ENCOUNTER — APPOINTMENT (INPATIENT)
Dept: NON INVASIVE DIAGNOSTICS | Facility: HOSPITAL | Age: 44
DRG: 773 | End: 2024-09-13
Payer: COMMERCIAL

## 2024-09-13 DIAGNOSIS — F19.90 IV DRUG USER: ICD-10-CM

## 2024-09-13 DIAGNOSIS — R07.9 CHEST PAIN: ICD-10-CM

## 2024-09-13 DIAGNOSIS — F11.93 OPIOID WITHDRAWAL (HCC): Primary | ICD-10-CM

## 2024-09-13 PROBLEM — B18.2 CHRONIC HEPATITIS C WITHOUT HEPATIC COMA (HCC): Status: ACTIVE | Noted: 2022-04-21

## 2024-09-13 PROBLEM — E11.9 TYPE 2 DIABETES MELLITUS WITHOUT COMPLICATION, WITHOUT LONG-TERM CURRENT USE OF INSULIN (HCC): Status: ACTIVE | Noted: 2023-04-03

## 2024-09-13 LAB
2HR DELTA HS TROPONIN: 2 NG/L
4HR DELTA HS TROPONIN: 1 NG/L
ALBUMIN SERPL BCG-MCNC: 4.8 G/DL (ref 3.5–5)
ALP SERPL-CCNC: 94 U/L (ref 34–104)
ALT SERPL W P-5'-P-CCNC: 58 U/L (ref 7–52)
ANION GAP SERPL CALCULATED.3IONS-SCNC: 15 MMOL/L (ref 4–13)
AORTIC ROOT: 3.7 CM
APICAL FOUR CHAMBER EJECTION FRACTION: 50 %
APTT PPP: 24 SECONDS (ref 23–34)
ASCENDING AORTA: 3.8 CM
AST SERPL W P-5'-P-CCNC: 51 U/L (ref 13–39)
ATRIAL RATE: 98 BPM
BASOPHILS # BLD AUTO: 0.07 THOUSANDS/ΜL (ref 0–0.1)
BASOPHILS NFR BLD AUTO: 1 % (ref 0–1)
BILIRUB SERPL-MCNC: 1.42 MG/DL (ref 0.2–1)
BILIRUB UR QL STRIP: NEGATIVE
BSA FOR ECHO PROCEDURE: 2.17 M2
BUN SERPL-MCNC: 8 MG/DL (ref 5–25)
CALCIUM SERPL-MCNC: 9.6 MG/DL (ref 8.4–10.2)
CARDIAC TROPONIN I PNL SERPL HS: 13 NG/L
CARDIAC TROPONIN I PNL SERPL HS: 14 NG/L
CARDIAC TROPONIN I PNL SERPL HS: 15 NG/L
CHLORIDE SERPL-SCNC: 102 MMOL/L (ref 96–108)
CLARITY UR: CLEAR
CO2 SERPL-SCNC: 19 MMOL/L (ref 21–32)
COLOR UR: ABNORMAL
CREAT SERPL-MCNC: 0.64 MG/DL (ref 0.6–1.3)
E WAVE DECELERATION TIME: 345 MS
E/A RATIO: 0.86
EOSINOPHIL # BLD AUTO: 0.03 THOUSAND/ΜL (ref 0–0.61)
EOSINOPHIL NFR BLD AUTO: 0 % (ref 0–6)
ERYTHROCYTE [DISTWIDTH] IN BLOOD BY AUTOMATED COUNT: 11.8 % (ref 11.6–15.1)
FLUAV RNA RESP QL NAA+PROBE: NEGATIVE
FLUBV RNA RESP QL NAA+PROBE: NEGATIVE
FRACTIONAL SHORTENING: 35 (ref 28–44)
GFR SERPL CREATININE-BSD FRML MDRD: 118 ML/MIN/1.73SQ M
GLUCOSE SERPL-MCNC: 124 MG/DL (ref 65–140)
GLUCOSE SERPL-MCNC: 137 MG/DL (ref 65–140)
GLUCOSE UR STRIP-MCNC: NEGATIVE MG/DL
HCT VFR BLD AUTO: 44.9 % (ref 36.5–49.3)
HGB BLD-MCNC: 16.2 G/DL (ref 12–17)
HGB UR QL STRIP.AUTO: NEGATIVE
IMM GRANULOCYTES # BLD AUTO: 0.07 THOUSAND/UL (ref 0–0.2)
IMM GRANULOCYTES NFR BLD AUTO: 1 % (ref 0–2)
INR PPP: 1.01 (ref 0.85–1.19)
INTERVENTRICULAR SEPTUM IN DIASTOLE (PARASTERNAL SHORT AXIS VIEW): 1.3 CM
INTERVENTRICULAR SEPTUM: 1.3 CM (ref 0.6–1.1)
KETONES UR STRIP-MCNC: ABNORMAL MG/DL
LAAS-AP2: 17 CM2
LAAS-AP4: 14.8 CM2
LACTATE SERPL-SCNC: 1.8 MMOL/L (ref 0.5–2)
LEFT ATRIUM SIZE: 4 CM
LEFT ATRIUM VOLUME (MOD BIPLANE): 38 ML
LEFT ATRIUM VOLUME INDEX (MOD BIPLANE): 15.3 ML/M2
LEFT INTERNAL DIMENSION IN SYSTOLE: 3.3 CM (ref 2.1–4)
LEFT VENTRICULAR INTERNAL DIMENSION IN DIASTOLE: 5.1 CM (ref 3.5–6)
LEFT VENTRICULAR POSTERIOR WALL IN END DIASTOLE: 1.4 CM
LEFT VENTRICULAR STROKE VOLUME: 79 ML
LEUKOCYTE ESTERASE UR QL STRIP: NEGATIVE
LVSV (TEICH): 79 ML
LYMPHOCYTES # BLD AUTO: 2.45 THOUSANDS/ΜL (ref 0.6–4.47)
LYMPHOCYTES NFR BLD AUTO: 16 % (ref 14–44)
MCH RBC QN AUTO: 30.9 PG (ref 26.8–34.3)
MCHC RBC AUTO-ENTMCNC: 36.1 G/DL (ref 31.4–37.4)
MCV RBC AUTO: 86 FL (ref 82–98)
MONOCYTES # BLD AUTO: 0.81 THOUSAND/ΜL (ref 0.17–1.22)
MONOCYTES NFR BLD AUTO: 5 % (ref 4–12)
MV E'TISSUE VEL-SEP: 10 CM/S
MV PEAK A VEL: 0.93 M/S
MV PEAK E VEL: 80 CM/S
MV STENOSIS PRESSURE HALF TIME: 100 MS
MV VALVE AREA P 1/2 METHOD: 2.2
NEUTROPHILS # BLD AUTO: 11.61 THOUSANDS/ΜL (ref 1.85–7.62)
NEUTS SEG NFR BLD AUTO: 77 % (ref 43–75)
NITRITE UR QL STRIP: NEGATIVE
NRBC BLD AUTO-RTO: 0 /100 WBCS
P AXIS: 47 DEGREES
PH UR STRIP.AUTO: 5.5 [PH]
PLATELET # BLD AUTO: 209 THOUSANDS/UL (ref 149–390)
PMV BLD AUTO: 10.7 FL (ref 8.9–12.7)
POTASSIUM SERPL-SCNC: 3.6 MMOL/L (ref 3.5–5.3)
PR INTERVAL: 156 MS
PROCALCITONIN SERPL-MCNC: 0.07 NG/ML
PROT SERPL-MCNC: 8.9 G/DL (ref 6.4–8.4)
PROT UR STRIP-MCNC: NEGATIVE MG/DL
PROTHROMBIN TIME: 13.6 SECONDS (ref 12.3–15)
QRS AXIS: 212 DEGREES
QRSD INTERVAL: 82 MS
QT INTERVAL: 330 MS
QTC INTERVAL: 421 MS
RBC # BLD AUTO: 5.25 MILLION/UL (ref 3.88–5.62)
RIGHT ATRIAL 2D VOLUME: 44 ML
RIGHT ATRIUM AREA SYSTOLE A4C: 17.4 CM2
RIGHT VENTRICLE ID DIMENSION: 4.3 CM
RSV RNA RESP QL NAA+PROBE: NEGATIVE
SARS-COV-2 RNA RESP QL NAA+PROBE: NEGATIVE
SINOTUBULAR JUNCTION: 3 CM
SL CV LEFT ATRIUM LENGTH A2C: 5.5 CM
SL CV LV EF: 65
SL CV PED ECHO LEFT VENTRICLE DIASTOLIC VOLUME (MOD BIPLANE) 2D: 123 ML
SL CV PED ECHO LEFT VENTRICLE SYSTOLIC VOLUME (MOD BIPLANE) 2D: 45 ML
SL CV SINUS OF VALSALVA 2D: 3.6 CM
SODIUM SERPL-SCNC: 136 MMOL/L (ref 135–147)
SP GR UR STRIP.AUTO: 1.01 (ref 1–1.03)
STJ: 3 CM
T WAVE AXIS: 7 DEGREES
TRICUSPID ANNULAR PLANE SYSTOLIC EXCURSION: 1.8 CM
UROBILINOGEN UR STRIP-ACNC: <2 MG/DL
VENTRICULAR RATE: 98 BPM
WBC # BLD AUTO: 15.04 THOUSAND/UL (ref 4.31–10.16)

## 2024-09-13 PROCEDURE — 71045 X-RAY EXAM CHEST 1 VIEW: CPT

## 2024-09-13 PROCEDURE — 93306 TTE W/DOPPLER COMPLETE: CPT

## 2024-09-13 PROCEDURE — 84145 PROCALCITONIN (PCT): CPT

## 2024-09-13 PROCEDURE — 84484 ASSAY OF TROPONIN QUANT: CPT

## 2024-09-13 PROCEDURE — 93306 TTE W/DOPPLER COMPLETE: CPT | Performed by: INTERNAL MEDICINE

## 2024-09-13 PROCEDURE — 99222 1ST HOSP IP/OBS MODERATE 55: CPT | Performed by: INTERNAL MEDICINE

## 2024-09-13 PROCEDURE — 93005 ELECTROCARDIOGRAM TRACING: CPT

## 2024-09-13 PROCEDURE — 85730 THROMBOPLASTIN TIME PARTIAL: CPT

## 2024-09-13 PROCEDURE — 81003 URINALYSIS AUTO W/O SCOPE: CPT

## 2024-09-13 PROCEDURE — 99285 EMERGENCY DEPT VISIT HI MDM: CPT | Performed by: EMERGENCY MEDICINE

## 2024-09-13 PROCEDURE — 82948 REAGENT STRIP/BLOOD GLUCOSE: CPT

## 2024-09-13 PROCEDURE — 85610 PROTHROMBIN TIME: CPT

## 2024-09-13 PROCEDURE — 83605 ASSAY OF LACTIC ACID: CPT

## 2024-09-13 PROCEDURE — 99255 IP/OBS CONSLTJ NEW/EST HI 80: CPT | Performed by: INTERNAL MEDICINE

## 2024-09-13 PROCEDURE — 0241U HB NFCT DS VIR RESP RNA 4 TRGT: CPT

## 2024-09-13 PROCEDURE — 80053 COMPREHEN METABOLIC PANEL: CPT

## 2024-09-13 PROCEDURE — 71275 CT ANGIOGRAPHY CHEST: CPT

## 2024-09-13 PROCEDURE — 87040 BLOOD CULTURE FOR BACTERIA: CPT

## 2024-09-13 PROCEDURE — 85025 COMPLETE CBC W/AUTO DIFF WBC: CPT

## 2024-09-13 PROCEDURE — 36415 COLL VENOUS BLD VENIPUNCTURE: CPT

## 2024-09-13 PROCEDURE — 93010 ELECTROCARDIOGRAM REPORT: CPT | Performed by: INTERNAL MEDICINE

## 2024-09-13 RX ORDER — ACETAMINOPHEN 325 MG/1
975 TABLET ORAL EVERY 8 HOURS SCHEDULED
Status: DISCONTINUED | OUTPATIENT
Start: 2024-09-13 | End: 2024-09-17 | Stop reason: HOSPADM

## 2024-09-13 RX ORDER — NICOTINE 21 MG/24HR
1 PATCH, TRANSDERMAL 24 HOURS TRANSDERMAL DAILY
Status: DISCONTINUED | OUTPATIENT
Start: 2024-09-14 | End: 2024-09-17 | Stop reason: HOSPADM

## 2024-09-13 RX ORDER — KETOROLAC TROMETHAMINE 30 MG/ML
15 INJECTION, SOLUTION INTRAMUSCULAR; INTRAVENOUS ONCE
Status: COMPLETED | OUTPATIENT
Start: 2024-09-13 | End: 2024-09-13

## 2024-09-13 RX ORDER — KETOROLAC TROMETHAMINE 30 MG/ML
15 INJECTION, SOLUTION INTRAMUSCULAR; INTRAVENOUS EVERY 6 HOURS PRN
Status: DISPENSED | OUTPATIENT
Start: 2024-09-13 | End: 2024-09-15

## 2024-09-13 RX ORDER — TRAZODONE HYDROCHLORIDE 50 MG/1
50 TABLET, FILM COATED ORAL
Status: DISCONTINUED | OUTPATIENT
Start: 2024-09-13 | End: 2024-09-17 | Stop reason: HOSPADM

## 2024-09-13 RX ORDER — ACETAMINOPHEN 325 MG/1
975 TABLET ORAL ONCE
Status: COMPLETED | OUTPATIENT
Start: 2024-09-13 | End: 2024-09-13

## 2024-09-13 RX ORDER — DIAZEPAM 10 MG/2ML
5 INJECTION, SOLUTION INTRAMUSCULAR; INTRAVENOUS ONCE
Status: COMPLETED | OUTPATIENT
Start: 2024-09-13 | End: 2024-09-13

## 2024-09-13 RX ORDER — LISINOPRIL 20 MG/1
20 TABLET ORAL DAILY
Status: DISCONTINUED | OUTPATIENT
Start: 2024-09-14 | End: 2024-09-13

## 2024-09-13 RX ORDER — GABAPENTIN 300 MG/1
300 CAPSULE ORAL 3 TIMES DAILY
Status: DISCONTINUED | OUTPATIENT
Start: 2024-09-13 | End: 2024-09-17 | Stop reason: HOSPADM

## 2024-09-13 RX ORDER — DIAZEPAM 5 MG
5 TABLET ORAL ONCE
Status: COMPLETED | OUTPATIENT
Start: 2024-09-13 | End: 2024-09-13

## 2024-09-13 RX ORDER — NICOTINE 21 MG/24HR
21 PATCH, TRANSDERMAL 24 HOURS TRANSDERMAL ONCE
Status: COMPLETED | OUTPATIENT
Start: 2024-09-13 | End: 2024-09-14

## 2024-09-13 RX ORDER — PANTOPRAZOLE SODIUM 40 MG/10ML
40 INJECTION, POWDER, LYOPHILIZED, FOR SOLUTION INTRAVENOUS
Status: DISCONTINUED | OUTPATIENT
Start: 2024-09-13 | End: 2024-09-17

## 2024-09-13 RX ORDER — CYCLOBENZAPRINE HCL 10 MG
10 TABLET ORAL 3 TIMES DAILY
Status: DISCONTINUED | OUTPATIENT
Start: 2024-09-13 | End: 2024-09-17 | Stop reason: HOSPADM

## 2024-09-13 RX ORDER — DIAZEPAM 10 MG/2ML
5 INJECTION, SOLUTION INTRAMUSCULAR; INTRAVENOUS EVERY 6 HOURS PRN
Status: DISCONTINUED | OUTPATIENT
Start: 2024-09-13 | End: 2024-09-14

## 2024-09-13 RX ORDER — LIDOCAINE 50 MG/G
1 PATCH TOPICAL ONCE
Status: COMPLETED | OUTPATIENT
Start: 2024-09-13 | End: 2024-09-13

## 2024-09-13 RX ORDER — CLONIDINE HYDROCHLORIDE 0.1 MG/1
0.1 TABLET ORAL EVERY 12 HOURS SCHEDULED
Status: DISCONTINUED | OUTPATIENT
Start: 2024-09-13 | End: 2024-09-14

## 2024-09-13 RX ORDER — ONDANSETRON 2 MG/ML
4 INJECTION INTRAMUSCULAR; INTRAVENOUS EVERY 6 HOURS PRN
Status: DISCONTINUED | OUTPATIENT
Start: 2024-09-13 | End: 2024-09-17 | Stop reason: HOSPADM

## 2024-09-13 RX ORDER — ENOXAPARIN SODIUM 100 MG/ML
40 INJECTION SUBCUTANEOUS DAILY
Status: DISCONTINUED | OUTPATIENT
Start: 2024-09-14 | End: 2024-09-17 | Stop reason: HOSPADM

## 2024-09-13 RX ORDER — METHOCARBAMOL 500 MG/1
500 TABLET, FILM COATED ORAL ONCE
Status: COMPLETED | OUTPATIENT
Start: 2024-09-13 | End: 2024-09-13

## 2024-09-13 RX ADMIN — CYCLOBENZAPRINE HYDROCHLORIDE 10 MG: 10 TABLET, FILM COATED ORAL at 21:12

## 2024-09-13 RX ADMIN — ACETAMINOPHEN 975 MG: 325 TABLET ORAL at 08:17

## 2024-09-13 RX ADMIN — CEFEPIME 2000 MG: 2 INJECTION, POWDER, FOR SOLUTION INTRAVENOUS at 18:13

## 2024-09-13 RX ADMIN — SODIUM CHLORIDE, SODIUM LACTATE, POTASSIUM CHLORIDE, AND CALCIUM CHLORIDE 1000 ML: .6; .31; .03; .02 INJECTION, SOLUTION INTRAVENOUS at 15:37

## 2024-09-13 RX ADMIN — TRAZODONE HYDROCHLORIDE 50 MG: 50 TABLET ORAL at 21:12

## 2024-09-13 RX ADMIN — CEFEPIME 2000 MG: 2 INJECTION, POWDER, FOR SOLUTION INTRAVENOUS at 09:28

## 2024-09-13 RX ADMIN — IOHEXOL 50 ML: 350 INJECTION, SOLUTION INTRAVENOUS at 17:45

## 2024-09-13 RX ADMIN — SODIUM CHLORIDE 1000 ML: 0.9 INJECTION, SOLUTION INTRAVENOUS at 06:49

## 2024-09-13 RX ADMIN — DIAZEPAM 5 MG: 10 INJECTION, SOLUTION INTRAMUSCULAR; INTRAVENOUS at 21:10

## 2024-09-13 RX ADMIN — CLONIDINE HYDROCHLORIDE 0.1 MG: 0.1 TABLET ORAL at 21:11

## 2024-09-13 RX ADMIN — ACETAMINOPHEN 975 MG: 325 TABLET, FILM COATED ORAL at 21:22

## 2024-09-13 RX ADMIN — DIAZEPAM 5 MG: 5 TABLET ORAL at 08:10

## 2024-09-13 RX ADMIN — VANCOMYCIN HYDROCHLORIDE 2000 MG: 5 INJECTION, POWDER, LYOPHILIZED, FOR SOLUTION INTRAVENOUS at 10:19

## 2024-09-13 RX ADMIN — CLONIDINE HYDROCHLORIDE 0.1 MG: 0.1 TABLET ORAL at 15:37

## 2024-09-13 RX ADMIN — VANCOMYCIN HYDROCHLORIDE 1500 MG: 1 INJECTION, POWDER, LYOPHILIZED, FOR SOLUTION INTRAVENOUS at 21:12

## 2024-09-13 RX ADMIN — MORPHINE SULFATE 2 MG: 2 INJECTION, SOLUTION INTRAMUSCULAR; INTRAVENOUS at 11:51

## 2024-09-13 RX ADMIN — NICOTINE 21 MG: 21 PATCH, EXTENDED RELEASE TRANSDERMAL at 11:59

## 2024-09-13 RX ADMIN — KETOROLAC TROMETHAMINE 15 MG: 30 INJECTION, SOLUTION INTRAMUSCULAR; INTRAVENOUS at 15:37

## 2024-09-13 RX ADMIN — LIDOCAINE 1 PATCH: 700 PATCH TOPICAL at 08:18

## 2024-09-13 RX ADMIN — ACETAMINOPHEN 975 MG: 325 TABLET, FILM COATED ORAL at 15:37

## 2024-09-13 RX ADMIN — ONDANSETRON 4 MG: 2 INJECTION INTRAMUSCULAR; INTRAVENOUS at 16:18

## 2024-09-13 RX ADMIN — PANTOPRAZOLE SODIUM 40 MG: 40 INJECTION, POWDER, FOR SOLUTION INTRAVENOUS at 15:38

## 2024-09-13 RX ADMIN — ONDANSETRON 4 MG: 2 INJECTION INTRAMUSCULAR; INTRAVENOUS at 22:03

## 2024-09-13 RX ADMIN — METHOCARBAMOL 500 MG: 500 TABLET ORAL at 08:18

## 2024-09-13 RX ADMIN — GABAPENTIN 300 MG: 300 CAPSULE ORAL at 21:11

## 2024-09-13 RX ADMIN — DIAZEPAM 5 MG: 10 INJECTION, SOLUTION INTRAMUSCULAR; INTRAVENOUS at 15:37

## 2024-09-13 RX ADMIN — DIAZEPAM 5 MG: 5 TABLET ORAL at 06:21

## 2024-09-13 RX ADMIN — KETOROLAC TROMETHAMINE 15 MG: 30 INJECTION, SOLUTION INTRAMUSCULAR; INTRAVENOUS at 08:17
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
## 2024-09-13 NOTE — SEPSIS NOTE
Sepsis Note   Bryant Lazcano 44 y.o. male MRN: [id number]  Unit/Bed#: [facility] Encounter: [id number]       Initial Sepsis Screening       Row Name 09/13/24 0710                Is the patient's history suggestive of a new or worsening infection? Yes (Proceed)  -BM        Suspected source of infection suspect infection, source unknown  -BM        Indicate SIRS criteria Tachycardia > 90 bpm;Leukocytosis (WBC > 35705 IJL) OR Leukopenia (WBC <4000 IJL) OR Bandemia (WBC >10% bands)  -BM        Are two or more of the above signs & symptoms of infection both present and new to the patient? Yes (Proceed)  -BM        Assess for evidence of organ dysfunction: Are any of the below criteria present within 6 hours of suspected infection and SIRS criteria that are NOT considered to be chronic conditions? --                  User Key  (r) = Recorded By, (t) = Taken By, (c) = Cosigned By      Initials Name Provider Type    BM Mati Calle MD Resident                        There is no height or weight on file to calculate BMI.  Wt Readings from Last 1 Encounters:   05/12/22 (!) 137 kg (302 lb)        Patient weight not recorded

## 2024-09-13 NOTE — ED PROVIDER NOTES
1. Opioid withdrawal (HCC)    2. Chest pain    3. IV drug user      ED Disposition       ED Disposition   Admit    Condition   Stable    Date/Time   Fri Sep 13, 2024 11:55 AM    Comment   --             Assessment & Plan       Medical Decision Making  Patient is a 44-year-old male presenting with chest pain, shortness of breath, shaking, and diaphoresis.    Differential includes but not limited to endocarditis, myocarditis, other source of infection, less likely withdrawal, bacteremia, ACS.  Cardiac and septic workup ordered.    Patient signed out to Dr. Nicole for further management workup.    Amount and/or Complexity of Data Reviewed  Labs: ordered. Decision-making details documented in ED Course.  Radiology: ordered.    Risk  OTC drugs.  Prescription drug management.  Decision regarding hospitalization.                  ED Course as of 09/16/24 2246   Fri Sep 13, 2024   0658 WBC(!): 15.04   0711 LACTIC ACID: 1.8       Medications   sodium chloride 0.9 % bolus 1,000 mL (0 mL Intravenous Stopped 9/13/24 1025)   diazepam (VALIUM) tablet 5 mg (5 mg Oral Given 9/13/24 0621)   cefepime (MAXIPIME) 2 g/50 mL dextrose IVPB (0 mg Intravenous Stopped 9/13/24 0958)   vancomycin (VANCOCIN) 2,000 mg in sodium chloride 0.9 % 500 mL IVPB (2,000 mg Intravenous New Bag 9/13/24 1019)   diazepam (VALIUM) tablet 5 mg (5 mg Oral Given 9/13/24 0810)   acetaminophen (TYLENOL) tablet 975 mg (975 mg Oral Given 9/13/24 0817)   ketorolac (TORADOL) injection 15 mg (15 mg Intravenous Given 9/13/24 0817)   methocarbamol (ROBAXIN) tablet 500 mg (500 mg Oral Given 9/13/24 0818)   lidocaine (LIDODERM) 5 % patch 1 patch (1 patch Topical Medication Applied 9/13/24 0818)   morphine injection 2 mg (2 mg Intravenous Given 9/13/24 1151)   nicotine (NICODERM CQ) 21 mg/24 hr TD 24 hr patch 21 mg (21 mg Transdermal Medication Applied 9/13/24 1159)       History of Present Illness       Patient is a 44-year-old male with active IV drug use presenting for  chest pain, shortness of breath, shaking, and diaphoresis.  Patient continues to use IV heroin and used last this morning.  He has had these symptoms for the last 3 days and they have been constant.  Patient presented to the ED because symptoms were not getting better.  He states this is never happened before.  He denies alcohol use but does have history of hepatitis C which he states was undetectable last time it was checked.  He denies other substance use but is unsure if things are mixed into what he uses.  He injects in his legs but does not have pain, redness, or swelling in those areas.  He denies abdominal pain, nausea, vomiting, urinary symptoms, numbness, tingling, or weakness.            Review of Systems        Objective     ED Triage Vitals   Temperature Pulse Blood Pressure Respirations SpO2 Patient Position - Orthostatic VS   09/13/24 0540 09/13/24 0540 09/13/24 0540 09/13/24 0540 09/13/24 0540 09/13/24 0540   98.9 °F (37.2 °C) (!) 120 (!) 181/113 18 98 % Lying      Temp Source Heart Rate Source BP Location FiO2 (%) Pain Score    09/13/24 0540 09/13/24 0540 09/13/24 0540 -- 09/13/24 0817    Oral Monitor Left arm  10 - Worst Possible Pain        Physical Exam  Vitals and nursing note reviewed.   Constitutional:       General: He is not in acute distress.     Appearance: Normal appearance. He is ill-appearing, toxic-appearing and diaphoretic.   HENT:      Head: Normocephalic and atraumatic.   Eyes:      Extraocular Movements: Extraocular movements intact.      Pupils: Pupils are equal, round, and reactive to light.   Cardiovascular:      Rate and Rhythm: Normal rate and regular rhythm.      Heart sounds: Normal heart sounds.   Pulmonary:      Effort: Pulmonary effort is normal. No respiratory distress.      Breath sounds: Normal breath sounds.   Abdominal:      General: Abdomen is flat.      Palpations: Abdomen is soft.   Musculoskeletal:         General: Normal range of motion.      Cervical back:  Normal range of motion and neck supple.      Comments: Rigors   Skin:     General: Skin is warm.      Comments: Multiple injection sites on the lower extremities, no signs of cellulitis   Neurological:      General: No focal deficit present.      Mental Status: He is alert and oriented to person, place, and time.         Labs Reviewed   CBC AND DIFFERENTIAL - Abnormal       Result Value    WBC 15.04 (*)     RBC 5.25      Hemoglobin 16.2      Hematocrit 44.9      MCV 86      MCH 30.9      MCHC 36.1      RDW 11.8      MPV 10.7      Platelets 209      nRBC 0      Segmented % 77 (*)     Immature Grans % 1      Lymphocytes % 16      Monocytes % 5      Eosinophils Relative 0      Basophils Relative 1      Absolute Neutrophils 11.61 (*)     Absolute Immature Grans 0.07      Absolute Lymphocytes 2.45      Absolute Monocytes 0.81      Eosinophils Absolute 0.03      Basophils Absolute 0.07     COMPREHENSIVE METABOLIC PANEL - Abnormal    Sodium 136      Potassium 3.6      Chloride 102      CO2 19 (*)     ANION GAP 15 (*)     BUN 8      Creatinine 0.64      Glucose 124      Calcium 9.6      AST 51 (*)     ALT 58 (*)     Alkaline Phosphatase 94      Total Protein 8.9 (*)     Albumin 4.8      Total Bilirubin 1.42 (*)     eGFR 118      Narrative:     National Kidney Disease Foundation guidelines for Chronic Kidney Disease (CKD):     Stage 1 with normal or high GFR (GFR > 90 mL/min/1.73 square meters)    Stage 2 Mild CKD (GFR = 60-89 mL/min/1.73 square meters)    Stage 3A Moderate CKD (GFR = 45-59 mL/min/1.73 square meters)    Stage 3B Moderate CKD (GFR = 30-44 mL/min/1.73 square meters)    Stage 4 Severe CKD (GFR = 15-29 mL/min/1.73 square meters)    Stage 5 End Stage CKD (GFR <15 mL/min/1.73 square meters)  Note: GFR calculation is accurate only with a steady state creatinine   UA W REFLEX TO MICROSCOPIC WITH REFLEX TO CULTURE - Abnormal    Color, UA Light Yellow      Clarity, UA Clear      Specific Gravity, UA 1.008      pH, UA  5.5      Leukocytes, UA Negative      Nitrite, UA Negative      Protein, UA Negative      Glucose, UA Negative      Ketones, UA 20 (1+) (*)     Urobilinogen, UA <2.0      Bilirubin, UA Negative      Occult Blood, UA Negative     CBC AND DIFFERENTIAL - Abnormal    WBC 17.96 (*)     RBC 4.88      Hemoglobin 15.2      Hematocrit 41.6      MCV 85      MCH 31.1      MCHC 36.5      RDW 11.9      MPV 10.8      Platelets 201      nRBC 0      Segmented % 78 (*)     Immature Grans % 0      Lymphocytes % 15      Monocytes % 7      Eosinophils Relative 0      Basophils Relative 0      Absolute Neutrophils 13.99 (*)     Absolute Immature Grans 0.06      Absolute Lymphocytes 2.65      Absolute Monocytes 1.20      Eosinophils Absolute 0.01      Basophils Absolute 0.05     COMPREHENSIVE METABOLIC PANEL - Abnormal    Sodium 139      Potassium 3.1 (*)     Chloride 102      CO2 27      ANION GAP 10      BUN 12      Creatinine 0.66      Glucose 178 (*)     Calcium 9.6      AST 30      ALT 42      Alkaline Phosphatase 79      Total Protein 8.5 (*)     Albumin 4.5      Total Bilirubin 1.82 (*)     eGFR 116      Narrative:     National Kidney Disease Foundation guidelines for Chronic Kidney Disease (CKD):     Stage 1 with normal or high GFR (GFR > 90 mL/min/1.73 square meters)    Stage 2 Mild CKD (GFR = 60-89 mL/min/1.73 square meters)    Stage 3A Moderate CKD (GFR = 45-59 mL/min/1.73 square meters)    Stage 3B Moderate CKD (GFR = 30-44 mL/min/1.73 square meters)    Stage 4 Severe CKD (GFR = 15-29 mL/min/1.73 square meters)    Stage 5 End Stage CKD (GFR <15 mL/min/1.73 square meters)  Note: GFR calculation is accurate only with a steady state creatinine   VANCOMYCIN, RANDOM - Abnormal    Vancomycin Rm 4.3 (*)    BASIC METABOLIC PANEL - Abnormal    Sodium 139      Potassium 3.4 (*)     Chloride 106      CO2 25      ANION GAP 8      BUN 12      Creatinine 0.58 (*)     Glucose 165 (*)     Calcium 9.4      eGFR 123      Narrative:      National Kidney Disease Foundation guidelines for Chronic Kidney Disease (CKD):     Stage 1 with normal or high GFR (GFR > 90 mL/min/1.73 square meters)    Stage 2 Mild CKD (GFR = 60-89 mL/min/1.73 square meters)    Stage 3A Moderate CKD (GFR = 45-59 mL/min/1.73 square meters)    Stage 3B Moderate CKD (GFR = 30-44 mL/min/1.73 square meters)    Stage 4 Severe CKD (GFR = 15-29 mL/min/1.73 square meters)    Stage 5 End Stage CKD (GFR <15 mL/min/1.73 square meters)  Note: GFR calculation is accurate only with a steady state creatinine   CBC AND DIFFERENTIAL - Abnormal    WBC 14.29 (*)     RBC 4.74      Hemoglobin 14.4      Hematocrit 41.2      MCV 87      MCH 30.4      MCHC 35.0      RDW 12.0      MPV 10.2      Platelets 175      nRBC 0      Segmented % 65      Immature Grans % 0      Lymphocytes % 26      Monocytes % 7      Eosinophils Relative 1      Basophils Relative 1      Absolute Neutrophils 9.29 (*)     Absolute Immature Grans 0.06      Absolute Lymphocytes 3.73      Absolute Monocytes 0.96      Eosinophils Absolute 0.17      Basophils Absolute 0.08      Narrative:     This is an appended report.  These results have been appended to a previously verified report.   CBC AND DIFFERENTIAL - Abnormal    WBC 10.43 (*)     RBC 4.74      Hemoglobin 14.4      Hematocrit 42.2      MCV 89      MCH 30.4      MCHC 34.1      RDW 12.1      MPV 10.9      Platelets 152      nRBC 0      Segmented % 55      Immature Grans % 1      Lymphocytes % 32      Monocytes % 7      Eosinophils Relative 4      Basophils Relative 1      Absolute Neutrophils 5.76      Absolute Immature Grans 0.05      Absolute Lymphocytes 3.36      Absolute Monocytes 0.71      Eosinophils Absolute 0.45      Basophils Absolute 0.10     COMPREHENSIVE METABOLIC PANEL - Abnormal    Sodium 144      Potassium 3.3 (*)     Chloride 107      CO2 28      ANION GAP 9      BUN 15      Creatinine 0.73      Glucose 135      Calcium 9.4      AST 53 (*)     ALT 61 (*)      Alkaline Phosphatase 84      Total Protein 7.4      Albumin 4.1      Total Bilirubin 0.95      eGFR 112      Narrative:     National Kidney Disease Foundation guidelines for Chronic Kidney Disease (CKD):     Stage 1 with normal or high GFR (GFR > 90 mL/min/1.73 square meters)    Stage 2 Mild CKD (GFR = 60-89 mL/min/1.73 square meters)    Stage 3A Moderate CKD (GFR = 45-59 mL/min/1.73 square meters)    Stage 3B Moderate CKD (GFR = 30-44 mL/min/1.73 square meters)    Stage 4 Severe CKD (GFR = 15-29 mL/min/1.73 square meters)    Stage 5 End Stage CKD (GFR <15 mL/min/1.73 square meters)  Note: GFR calculation is accurate only with a steady state creatinine   POCT GLUCOSE - Abnormal    POC Glucose 148 (*)    COVID19, INFLUENZA A/B, RSV PCR, [facility] - Normal    SARS-CoV-2 Negative      INFLUENZA A PCR Negative      INFLUENZA B PCR Negative      RSV PCR Negative      Narrative:     This test has been performed using the CoV-2/Flu/RSV plus assay on the [employer] platform. This test has been validated by the  and verified by the performing laboratory.     This test is designed to amplify and detect the following: nucleocapsid (N), envelope (E), and RNA-dependent RNA polymerase (RdRP) genes of the SARS-CoV-2 genome; matrix (M), basic polymerase (PB2), and acidic protein (PA) segments of the influenza A genome; matrix (M) and non-structural protein (NS) segments of the influenza B genome, and the nucleocapsid genes of RSV A and RSV B.     Positive results are indicative of the presence of Flu A, Flu B, RSV, and/or SARS-CoV-2 RNA. Positive results for SARS-CoV-2 or suspected novel influenza should be reported to state, local, or federal health departments according to local reporting requirements.      All results should be assessed in conjunction with clinical presentation and other laboratory markers for clinical management.     FOR PEDIATRIC PATIENTS - copy/paste COVID Guidelines URL to browser:  [email or web address]      LACTIC ACID, PLASMA (W/REFLEX IF RESULT > 2.0) - Normal    LACTIC ACID 1.8      Narrative:     Result may be elevated if tourniquet was used during collection.   PROTIME-INR - Normal    Protime 13.6      INR 1.01      Narrative:     INR Therapeutic Range    Indication                                             INR Range      Atrial Fibrillation                                               2.0-3.0  Hypercoagulable State                                    2.0.2.3  Left Ventricular Asist Device                            2.0-3.0  Mechanical Heart Valve                                  -    Aortic(with afib, MI, embolism, HF, LA enlargement,    and/or coagulopathy)                                     2.0-3.0 (2.5-3.5)     Mitral                                                             2.5-3.5  Prosthetic/Bioprosthetic Heart Valve               2.0-3.0  Venous thromboembolism (VTE: VT, PE        2.0-3.0   APTT - Normal    PTT 24     HS TROPONIN I 0HR - Normal    hs TnI 0hr 13     PROCALCITONIN TEST - Normal    Procalcitonin 0.07     HS TROPONIN I 2HR - Normal    hs TnI 2hr 15      Delta 2hr hsTnI 2     HS TROPONIN I 4HR - Normal    hs TnI 4hr 14      Delta 4hr hsTnI 1     VANCOMYCIN, RANDOM - Normal    Vancomycin Rm 16.1     RAPID HIV 1/2 AB-AG COMBO FOR 12 YR OLD AND ABOVE - Normal    Rapid HIV 1 AND 2 Non-Reactive      HIV-1 P24 Ag Screen Non-Reactive      Narrative:     Negative for HIV-1 p24 Antigen.  Negative for HIV-1 and/or HIV-2 Antibody.   POCT GLUCOSE - Normal    POC Glucose 137     BLOOD CULTURE    Blood Culture No Growth at 72 hrs.     BLOOD CULTURE    Blood Culture No Growth at 72 hrs.     BLOOD CULTURE    Blood Culture No Growth at 72 hrs.     HEPATITIS C RNA, QUANTITATIVE PCR, [facility]     MRI cervical spine w wo contrast   Final Interpretation by Santiago Garay MD (09/14 1626)      No MR evidence to suggest discitis  osteomyelitis of the cervical spine. No epidural paraspinal abscess is identified in this region.      Multilevel cervical spondylosis, as described above, contributing to at most mild right-sided canal stenosis at C3-C4, and moderate right neural foraminal stenosis at this level.         Workstation performed: [id number]         CTA chest pe study   Final Interpretation by Shante Archibald MD (09/13 1826)         1. No central pulmonary embolism. Limited study.   2. Mosaic pattern which could reflect air trapping.                  Workstation performed: [id number]         XR chest portable   Final Interpretation by Zak Sal MD (09/13 8856)      No acute cardiopulmonary disease.            Resident: Bola Carroll I, the attending radiologist, have reviewed the images and agree with the final report above.      Workstation performed: [id number]             Procedures         Mati Calle MD  09/16/24 1492

## 2024-09-13 NOTE — UTILIZATION REVIEW
NOTIFICATION OF INPATIENT ADMISSION   AUTHORIZATION REQUEST   [facility]  Address: [address] [place] [address]  Tax ID: [id number]  NPI: [id number] ATTENDING PROVIDER:  Attending Name and NPI#: Emir David Do [[id number]]  Address: [address] [place] [address]  Phone: [phone]   ADMISSION INFORMATION:  Place of Service: Inpatient [facility]  Place of Service Code: 21  Inpatient Admission Date/Time: 9/13/24 12:05 PM  Discharge Date/Time: No discharge date for patient encounter.  Admitting Diagnosis Code/Description:  Chest pain [R07.9]  IV drug user [F19.90]     UTILIZATION REVIEW CONTACT:  Antolin Tapia Utilization   Network Utilization Review Department  Phone: [phone]  Fax: [phone]  Email: [email or web address]  Contact for approvals/pending authorizations, clinical reviews, and discharge.     PHYSICIAN ADVISORY SERVICES:  Medical Necessity Denial & Rvze-jx-Pmrz Review  Phone: [phone]  Fax: [phone]  Email: [email or web address]     DISCHARGE SUPPORT TEAM:  For Patients Discharge Needs & Updates  Phone: [phone] opt. 2 Fax: [phone]  Email: [email or web address]

## 2024-09-13 NOTE — PLAN OF CARE
Problem: PAIN - ADULT  Goal: Verbalizes/displays adequate comfort level or baseline comfort level  Description: Interventions:  - Encourage patient to monitor pain and request assistance  - Assess pain using appropriate pain scale  - Administer analgesics based on type and severity of pain and evaluate response  - Implement non-pharmacological measures as appropriate and evaluate response  - Consider cultural and social influences on pain and pain management  - Notify physician/advanced practitioner if interventions unsuccessful or patient reports new pain  Outcome: Not Progressing     Problem: INFECTION - ADULT  Goal: Absence or prevention of progression during hospitalization  Description: INTERVENTIONS:  - Assess and monitor for signs and symptoms of infection  - Monitor lab/diagnostic results  - Monitor all insertion sites, i.e. indwelling lines, tubes, and drains  - Monitor endotracheal if appropriate and nasal secretions for changes in amount and color  - [place] appropriate cooling/warming therapies per order  - Administer medications as ordered  - Instruct and encourage patient and family to use good hand hygiene technique  - Identify and instruct in appropriate isolation precautions for identified infection/condition  Outcome: Progressing  Goal: Absence of fever/infection during neutropenic period  Description: INTERVENTIONS:  - Monitor WBC    Outcome: Progressing

## 2024-09-13 NOTE — ED ATTENDING ATTESTATION
9/13/2024  I, Candido Craig MD, saw and evaluated the patient. I have discussed the patient with the resident/non-physician practitioner and agree with the resident's/non-physician practitioner's findings, Plan of Care, and MDM as documented in the resident's/non-physician practitioner's note, except where noted. All available labs and Radiology studies were reviewed.  I was present for key portions of any procedure(s) performed by the resident/non-physician practitioner and I was immediately available to provide assistance.       At this point I agree with the current assessment done in the Emergency Department.  I have conducted an independent evaluation of this patient a history and physical is as follows:      Final Diagnosis:  1. Chest pain    2. IV drug user      Chief Complaint   Patient presents with    Dizziness     Pt BIBA from home. Pt c/o dizziness that started yesterday morning and has been off and on along with chest pain for the last couple days. Pt reports relapsing in the last month using oxycodone after being clean for about 10 years.            A:  -44-year-old male who presents with chills, body aches, chest pain, etc.      P:  -CBC to evaluate for evidence of leukocytosis.  -CMP to evaluate renal function, electrolytes, LFTs in the setting of possible infection.  -Lactic acid to evaluate for endorgan ischemia.  -Blood cultures x2 to evaluate for infection.  -Urinalysis to evaluate for UTI as possible source.  -Chest x-ray to evaluate for pneumonia as possible source.  -We will start with 1 L of IV fluids.   -EKG/troponin to evaluate for arabella-/myocarditis.      H:    44-year-old male who presents with multiple complaints.  Couple days ago, patient started to experience chills, body aches, chest pain, shortness of breath, cough, congestion.  Patient has a history of IV drug use.  Last used several hours ago.  No known sick contacts.  Denies any new rashes.      PMH:  Past Medical History:  "  Diagnosis Date    Asthma     Hepatitis C        PSH:  Past Surgical History:   Procedure Laterality Date    BACK SURGERY  2010    LUMBAR PUNCTURE           PE:   Vitals:    09/13/24 1249 09/13/24 1349 09/13/24 1400 09/13/24 2010   BP:  137/72 137/72 170/80   BP Location:  Right arm  Right arm   Pulse:  98 98 92   Resp:  18     Temp: 98.7 °F (37.1 °C) 98.9 °F (37.2 °C)  98.2 °F (36.8 °C)   TempSrc: Oral Oral  Oral   SpO2:       Weight:  54.4 kg (120 lb) 99.8 kg (220 lb)    Height:  5' 10\" (1.778 m) 5' 10\" (1.778 m)          Constitutional: Ill-appearing.  Rigors.  HENT:   Mouth/Throat: Uvula is midline, oropharynx is clear and moist and mucous membranes are normal.   Eyes: Pupils are equal, round, and reactive to light. Conjunctivae and EOM are normal.   Neck: Trachea normal. No thyroid mass and no thyromegaly present.   Cardiovascular: Normal rate, regular rhythm, normal heart sounds.   No murmur heard.  Pulmonary/Chest: Effort normal and breath sounds normal.   Abdominal: Soft. Normal appearance and bowel sounds are normal. There is no tenderness. There is no rebound, no guarding.   Neurological: He is alert.    Skin: Diaphoretic.  No rash or infected skin lesions.          - 13 point ROS was performed and all are normal unless stated in the history above.   - Nursing note reviewed. Vitals reviewed.   - Orders placed by myself and/or advanced practitioner / resident.    - Previous chart was reviewed  - No language barrier.   - History obtained from patient.   - There are no limitations to the history obtained. Reasons ROS could not be obtained:  N/A         Medications   nicotine (NICODERM CQ) 21 mg/24 hr TD 24 hr patch 21 mg (21 mg Transdermal Medication Applied 9/13/24 1159)   cloNIDine (CATAPRES) tablet 0.1 mg (0.1 mg Oral Given 9/13/24 2111)   diazepam (VALIUM) injection 5 mg (5 mg Intravenous Given 9/13/24 1537)   vancomycin (VANCOCIN) 1500 mg in sodium chloride 0.9% 250 mL IVPB (1,500 mg Intravenous New Bag " 9/13/24 2112)   ceFEPime (MAXIPIME) 2,000 mg in dextrose 5 % 50 mL IVPB (2,000 mg Intravenous New Bag 9/13/24 1813)   acetaminophen (TYLENOL) tablet 975 mg (975 mg Oral Given 9/13/24 2122)   ketorolac (TORADOL) injection 15 mg (15 mg Intravenous Given 9/13/24 1537)   pantoprazole (PROTONIX) injection 40 mg (40 mg Intravenous Given 9/13/24 1538)   nicotine (NICODERM CQ) 14 mg/24hr TD 24 hr patch 1 patch (has no administration in time range)   enoxaparin (LOVENOX) subcutaneous injection 40 mg (has no administration in time range)   ondansetron (ZOFRAN) injection 4 mg (4 mg Intravenous Given 9/13/24 1618)   gabapentin (NEURONTIN) capsule 300 mg (300 mg Oral Given 9/13/24 2111)   traZODone (DESYREL) tablet 50 mg (50 mg Oral Given 9/13/24 2112)   cyclobenzaprine (FLEXERIL) tablet 10 mg (10 mg Oral Given 9/13/24 2112)   doxylamine (UNISOM) tablet 25 mg (has no administration in time range)   sodium chloride 0.9 % bolus 1,000 mL (0 mL Intravenous Stopped 9/13/24 1025)   diazepam (VALIUM) tablet 5 mg (5 mg Oral Given 9/13/24 0621)   cefepime (MAXIPIME) 2 g/50 mL dextrose IVPB (0 mg Intravenous Stopped 9/13/24 0958)   vancomycin (VANCOCIN) 2,000 mg in sodium chloride 0.9 % 500 mL IVPB (2,000 mg Intravenous New Bag 9/13/24 1019)   diazepam (VALIUM) tablet 5 mg (5 mg Oral Given 9/13/24 0810)   acetaminophen (TYLENOL) tablet 975 mg (975 mg Oral Given 9/13/24 0817)   ketorolac (TORADOL) injection 15 mg (15 mg Intravenous Given 9/13/24 0817)   methocarbamol (ROBAXIN) tablet 500 mg (500 mg Oral Given 9/13/24 0818)   lidocaine (LIDODERM) 5 % patch 1 patch (1 patch Topical Patch Removed 9/13/24 2044)   morphine injection 2 mg (2 mg Intravenous Given 9/13/24 1151)   lactated ringers bolus 1,000 mL (1,000 mL Intravenous New Bag 9/13/24 1537)   iohexol (OMNIPAQUE) 350 MG/ML injection (MULTI-DOSE) 50 mL (50 mL Intravenous Given 9/13/24 0535)   diazepam (VALIUM) injection 5 mg (5 mg Intravenous Given 9/13/24 2110)     CTA chest pe study    Final Result         1. No central pulmonary embolism. Limited study.   2. Mosaic pattern which could reflect air trapping.                  Workstation performed: [id number]         XR chest portable   Final Result      No acute cardiopulmonary disease.            Resident: Bola Carroll I, the attending radiologist, have reviewed the images and agree with the final report above.      Workstation performed: [id number]         MRI inpatient order    (Results Pending)     Orders Placed This Encounter   Procedures    Blood culture #1    Blood culture #2    Blood culture    COVID/FLU/RSV    Hepatitis C RNA, Quantitative PCR, [facility]    XR chest portable    CTA chest pe study    MRI inpatient order    CBC and differential    Comprehensive metabolic panel    Lactic acid    Protime-INR    APTT    UA w Reflex to Microscopic w Reflex to Culture    HS Troponin 0hr (reflex protocol)    Procalcitonin    HS Troponin I 2hr    HS Troponin I 4hr    RAPID HIV 1/2 AB-AG COMBO for 12 years old and above    CBC and differential    Comprehensive metabolic panel    Vancomycin, random    Diet Regular; Regular House    Nursing communication Continue IV as ordered.    Notify admitting physician    Notify admitting physician on arrival    24 Hour Telemetry Monitoring    Vital Signs per unit routine    Up and OOB as tolerated    I/O    Daily weights - Use standing scale to weigh patient    Insert peripheral IV    Maintain IV access    Apply SCD or Foot pumps    Notify provider if COWS remains > 13 in 24 hours    COWS every shift for 24 hours    Level 1-Full Code: all life saving measures are indicated    Inpatient consult to Infectious Diseases    Pharmacy general consult    ECG 12 lead    Echo complete w/ contrast if indicated    INPATIENT ADMISSION     Labs Reviewed   CBC AND DIFFERENTIAL - Abnormal       Result Value Ref Range Status    WBC 15.04 (*) 4.31 - 10.16 Thousand/uL Final    RBC 5.25  3.88 - 5.62 Million/uL Final     Hemoglobin 16.2  12.0 - 17.0 g/dL Final    Hematocrit 44.9  36.5 - 49.3 % Final    MCV 86  82 - 98 fL Final    MCH 30.9  26.8 - 34.3 pg Final    MCHC 36.1  31.4 - 37.4 g/dL Final    RDW 11.8  11.6 - 15.1 % Final    MPV 10.7  8.9 - 12.7 fL Final    Platelets 209  149 - 390 Thousands/uL Final    nRBC 0  /100 WBCs Final    Segmented % 77 (*) 43 - 75 % Final    Immature Grans % 1  0 - 2 % Final    Lymphocytes % 16  14 - 44 % Final    Monocytes % 5  4 - 12 % Final    Eosinophils Relative 0  0 - 6 % Final    Basophils Relative 1  0 - 1 % Final    Absolute Neutrophils 11.61 (*) 1.85 - 7.62 Thousands/µL Final    Absolute Immature Grans 0.07  0.00 - 0.20 Thousand/uL Final    Absolute Lymphocytes 2.45  0.60 - 4.47 Thousands/µL Final    Absolute Monocytes 0.81  0.17 - 1.22 Thousand/µL Final    Eosinophils Absolute 0.03  0.00 - 0.61 Thousand/µL Final    Basophils Absolute 0.07  0.00 - 0.10 Thousands/µL Final   COMPREHENSIVE METABOLIC PANEL - Abnormal    Sodium 136  135 - 147 mmol/L Final    Potassium 3.6  3.5 - 5.3 mmol/L Final    Comment: Slightly Hemolyzed:Results may be affected.    Chloride 102  96 - 108 mmol/L Final    CO2 19 (*) 21 - 32 mmol/L Final    ANION GAP 15 (*) 4 - 13 mmol/L Final    BUN 8  5 - 25 mg/dL Final    Creatinine 0.64  0.60 - 1.30 mg/dL Final    Comment: Standardized to IDMS reference method    Glucose 124  65 - 140 mg/dL Final    Comment: If the patient is fasting, the ADA then defines impaired fasting glucose as > 100 mg/dL and diabetes as > or equal to 123 mg/dL.    Calcium 9.6  8.4 - 10.2 mg/dL Final    AST 51 (*) 13 - 39 U/L Final    Comment: Slightly Hemolyzed:Results may be affected.    ALT 58 (*) 7 - 52 U/L Final    Comment: Specimen collection should occur prior to Sulfasalazine administration due to the potential for falsely depressed results.     Alkaline Phosphatase 94  34 - 104 U/L Final    Total Protein 8.9 (*) 6.4 - 8.4 g/dL Final    Albumin 4.8  3.5 - 5.0 g/dL Final    Total Bilirubin  1.42 (*) 0.20 - 1.00 mg/dL Final    Comment: Use of this assay is not recommended for patients undergoing treatment with eltrombopag due to the potential for falsely elevated results.  N-acetyl-p-benzoquinone imine (metabolite of Acetaminophen) will generate erroneously low results in samples for patients that have taken an overdose of Acetaminophen.    eGFR 118  ml/min/1.73sq m Final    Narrative:     National Kid[employer] Disease Foundation guidelines for Chronic Kidney Disease (CKD):     Stage 1 with normal or high GFR (GFR > 90 mL/min/1.73 square meters)    Stage 2 Mild CKD (GFR = 60-89 mL/min/1.73 square meters)    Stage 3A Moderate CKD (GFR = 45-59 mL/min/1.73 square meters)    Stage 3B Moderate CKD (GFR = 30-44 mL/min/1.73 square meters)    Stage 4 Severe CKD (GFR = 15-29 mL/min/1.73 square meters)    Stage 5 End Stage CKD (GFR <15 mL/min/1.73 square meters)  Note: GFR calculation is accurate only with a steady state creatinine   UA W REFLEX TO MICROSCOPIC WITH REFLEX TO CULTURE - Abnormal    Color, UA Light Yellow   Final    Clarity, UA Clear   Final    Specific Gravity, UA 1.008  1.003 - 1.030 Final    pH, UA 5.5  4.5, 5.0, 5.5, 6.0, 6.5, 7.0, 7.5, 8.0 Final    Leukocytes, UA Negative  Negative Final    Nitrite, UA Negative  Negative Final    Protein, UA Negative  Negative mg/dl Final    Glucose, UA Negative  Negative mg/dl Final    Ketones, UA 20 (1+) (*) Negative mg/dl Final    Urobilinogen, UA <2.0  <2.0 mg/dl mg/dl Final    Bilirubin, UA Negative  Negative Final    Occult Blood, UA Negative  Negative Final   COVID19, INFLUENZA A/B, RSV PCR, [facility] - Normal    SARS-CoV-2 Negative  Negative Final    Comment:      INFLUENZA A PCR Negative  Negative Final    Comment:      INFLUENZA B PCR Negative  Negative Final    Comment:      RSV PCR Negative  Negative Final    Comment:      Narrative:     This test has been performed using the CoV-2/Flu/RSV plus assay on the [employer] GeneXpert platform. This test has been  validated by the  and verified by the performing laboratory.     This test is designed to amplify and detect the following: nucleocapsid (N), envelope (E), and RNA-dependent RNA polymerase (RdRP) genes of the SARS-CoV-2 genome; matrix (M), basic polymerase (PB2), and acidic protein (PA) segments of the influenza A genome; matrix (M) and non-structural protein (NS) segments of the influenza B genome, and the nucleocapsid genes of RSV A and RSV B.     Positive results are indicative of the presence of Flu A, Flu B, RSV, and/or SARS-CoV-2 RNA. Positive results for SARS-CoV-2 or suspected novel influenza should be reported to state, local, or federal health departments according to local reporting requirements.      All results should be assessed in conjunction with clinical presentation and other laboratory markers for clinical management.     FOR PEDIATRIC PATIENTS - copy/paste COVID Guidelines URL to browser: [email or web address]      LACTIC ACID, PLASMA (W/REFLEX IF RESULT > 2.0) - Normal    LACTIC ACID 1.8  0.5 - 2.0 mmol/L Final    Narrative:     Result may be elevated if tourniquet was used during collection.   PROTIME-INR - Normal    Protime 13.6  12.3 - 15.0 seconds Final    INR 1.01  0.85 - 1.19 Final    Narrative:     INR Therapeutic Range    Indication                                             INR Range      Atrial Fibrillation                                               2.0-3.0  Hypercoagulable State                                    2.0.2.3  Left Ventricular Asist Device                            2.0-3.0  Mechanical Heart Valve                                  -    Aortic(with afib, MI, embolism, HF, LA enlargement,    and/or coagulopathy)                                     2.0-3.0 (2.5-3.5)     Mitral                                                             2.5-3.5  Prosthetic/Bioprosthetic Heart Valve                "2.0-3.0  Venous thromboembolism (VTE: VT, PE        2.0-3.0   APTT - Normal    PTT 24  23 - 34 seconds Final    Comment: Therapeutic Heparin Range =  60-90 seconds   HS TROPONIN I 0HR - Normal    hs TnI 0hr 13  \"Refer to ACS Flowchart\"- see link ng/L Final    Comment:                                              Initial (time 0) result  If >=50 ng/L, Myocardial injury suggested ;  Type of myocardial injury and treatment strategy  to be determined.  If 5-49 ng/L, a delta result at 2 hours and or 4 hours will be needed to further evaluate.  If <4 ng/L, and chest pain has been >3 hours since onset, patient may qualify for discharge based on the HEART score in the ED.  If <5 ng/L and <3hours since onset of chest pain, a delta result at 2 hours will be needed to further evaluate.    HS Troponin 99th Percentile URL of a Health Population=12 ng/L with a 95% Confidence Interval of 8-18 ng/L.    Second Troponin (time 2 hours)  If calculated delta >= 20 ng/L,  Myocardial injury suggested ; Type of myocardial injury and treatment strategy to be determined.  If 5-49 ng/L and the calculated delta is 5-19 ng/L, consult medical service for evaluation.  Continue evaluation for ischemia on ecg and other possible etiology and repeat hs troponin at 4 hours.  If delta is <5 ng/L at 2 hours, consider discharge based on risk stratification via the HEART score (if in ED), or LAYLA risk score in IP/Observation.    HS Troponin 99th Percentile URL of a Health Population=12 ng/L with a 95% Confidence Interval of 8-18 ng/L.   PROCALCITONIN TEST - Normal    Procalcitonin 0.07  <=0.25 ng/ml Final    Comment: Suspected Lower Respiratory Tract Infection (LRTI):  - LESS than or EQUAL to 0.25 ng/mL:   low likelihood for bacterial LRTI; antibiotics DISCOURAGED.  - GREATER than 0.25 ng/mL:   increased likelihood for bacterial LRTI; antibiotics ENCOURAGED.    Suspected Sepsis:  - Strongly consider initiating antibiotics in ALL UNSTABLE patients.  - LESS " than or EQUAL to 0.5 ng/mL:   low likelihood for bacterial sepsis; antibiotics DISCOURAGED.  - GREATER than 0.5 ng/mL:   increased likelihood for bacterial sepsis; antibiotics ENCOURAGED.  - GREATER than 2 ng/mL:   high risk for severe sepsis / septic shock; antibiotics strongly ENCOURAGED.    Decisions on antibiotic use should not be based solely on Procalcitonin (PCT) levels. If PCT is low but uncertainty exists with stopping antibiotics, repeat PCT in 6-24 hours to confirm the low level. If antibiotics are administered (regardless if initial PCT was high or low), repeat PCT every 1-2 days to consider early antibiotic cessation (when GREATER than 80% decrease from the peak OR when PCT drops below designated cutoffs, whichever comes first), so long as the infection is NOT one that typically requires prolonged treatment durations (e.g., bone/joint infections, endocarditis, Staph. aureus bacteremia).    Situations of FALSE-POSITIVE Procalcitonin values:  1) Newborns < 72 hours old  2) Massive stress from severe trauma / burns, major surgery, acute pancreatitis, cardiogenic / hemorrhagic shock, sickle cell crisis, or other organ perfusion abnormalities  3) Malaria and some Candidal infections  4) Treatment with agents that stimulate cytokines (e.g., OKT3, anti-lymphocyte globulins, alemtuzumab, IL-2, granulocyte transfusion [NOT GCSFs])  5) Chronic renal disease causes elevated baseline levels (consider GREATER than 0.75 ng/mL as an abnormal cut-off); initiating HD/CRRT may cause transient decreases  6) Paraneoplastic syndromes from medullary thyroid or SCLC, some forms of vasculitis, and acute [id number] disease    Situations of FALSE-NEGATIVE Procalcitonin values:  1) Too early in clinical course for PCT to have reached its peak (may repeat in 6-24 hours to confirm low level)  2) Localized infection WITHOUT systemic (SIRS / sepsis) response (e.g., an abscess, osteomyelitis, cystitis)  3) Mycobacteria (e.g.,  "Tuberculosis, MAC)  4) Cystic fibrosis exacerbations     HS TROPONIN I 2HR - Normal    hs TnI 2hr 15  \"Refer to ACS Flowchart\"- see link ng/L Final    Comment:                                              Initial (time 0) result  If >=50 ng/L, Myocardial injury suggested ;  Type of myocardial injury and treatment strategy  to be determined.  If 5-49 ng/L, a delta result at 2 hours and or 4 hours will be needed to further evaluate.  If <4 ng/L, and chest pain has been >3 hours since onset, patient may qualify for discharge based on the HEART score in the ED.  If <5 ng/L and <3hours since onset of chest pain, a delta result at 2 hours will be needed to further evaluate.    HS Troponin 99th Percentile URL of a Health Population=12 ng/L with a 95% Confidence Interval of 8-18 ng/L.    Second Troponin (time 2 hours)  If calculated delta >= 20 ng/L,  Myocardial injury suggested ; Type of myocardial injury and treatment strategy to be determined.  If 5-49 ng/L and the calculated delta is 5-19 ng/L, consult medical service for evaluation.  Continue evaluation for ischemia on ecg and other possible etiology and repeat hs troponin at 4 hours.  If delta is <5 ng/L at 2 hours, consider discharge based on risk stratification via the HEART score (if in ED), or LAYLA risk score in IP/Observation.    HS Troponin 99th Percentile URL of a Health Population=12 ng/L with a 95% Confidence Interval of 8-18 ng/L.    Delta 2hr hsTnI 2  <20 ng/L Final   HS TROPONIN I 4HR - Normal    hs TnI 4hr 14  \"Refer to ACS Flowchart\"- see link ng/L Final    Comment:                                              Initial (time 0) result  If >=50 ng/L, Myocardial injury suggested ;  Type of myocardial injury and treatment strategy  to be determined.  If 5-49 ng/L, a delta result at 2 hours and or 4 hours will be needed to further evaluate.  If <4 ng/L, and chest pain has been >3 hours since onset, patient may qualify for discharge based on the HEART score in " the ED.  If <5 ng/L and <3hours since onset of chest pain, a delta result at 2 hours will be needed to further evaluate.    HS Troponin 99th Percentile URL of a Health Population=12 ng/L with a 95% Confidence Interval of 8-18 ng/L.    Second Troponin (time 2 hours)  If calculated delta >= 20 ng/L,  Myocardial injury suggested ; Type of myocardial injury and treatment strategy to be determined.  If 5-49 ng/L and the calculated delta is 5-19 ng/L, consult medical service for evaluation.  Continue evaluation for ischemia on ecg and other possible etiology and repeat hs troponin at 4 hours.  If delta is <5 ng/L at 2 hours, consider discharge based on risk stratification via the HEART score (if in ED), or LAYLA risk score in IP/Observation.    HS Troponin 99th Percentile URL of a Health Population=12 ng/L with a 95% Confidence Interval of 8-18 ng/L.    Delta 4hr hsTnI 1  <20 ng/L Final   BLOOD CULTURE    Blood Culture Received in Microbiology Lab. Culture in Progress.   Preliminary   BLOOD CULTURE    Blood Culture Received in Microbiology Lab. Culture in Progress.   Preliminary   BLOOD CULTURE    Blood Culture Received in Microbiology Lab. Culture in Progress.   Preliminary     Time reflects when diagnosis was documented in both MDM as applicable and the Disposition within this note       Time User Action Codes Description Comment    9/13/2024 11:55 AM Betty Nicole Add [R07.9] Chest pain     9/13/2024 12:38 PM Emir David Add [F19.90] IV drug user           ED Disposition       ED Disposition   Admit    Condition   Stable    Date/Time   Fri Sep 13, 2024 11:55 AM    Comment   --             Follow-up Information    None       Current Discharge Medication List        CONTINUE these medications which have NOT CHANGED    Details   acetaminophen (TYLENOL) 650 mg CR tablet Take 1 tablet (650 mg total) by mouth every 8 (eight) hours as needed for mild pain  Qty: 30 tablet, Refills: 0    Associated Diagnoses: Pain, dental     "  albuterol (PROVENTIL HFA,VENTOLIN HFA) 90 mcg/act inhaler Inhale 2 puffs every 6 (six) hours as needed for wheezing  Qty: 6.7 g, Refills: 0      ibuprofen (MOTRIN) 800 mg tablet Take 1 tablet (800 mg total) by mouth every 8 (eight) hours as needed for mild pain  Qty: 30 tablet, Refills: 0    Associated Diagnoses: Knee injury, initial encounter      lisinopril (ZESTRIL) 20 mg tablet Take 20 mg by mouth in the morning.           No discharge procedures on file.  Prior to Admission Medications   Prescriptions Last Dose Informant Patient Reported? Taking?   acetaminophen (TYLENOL) 650 mg CR tablet Not Taking  No No   Sig: Take 1 tablet (650 mg total) by mouth every 8 (eight) hours as needed for mild pain   Patient not taking: Reported on 4/17/2022   albuterol (PROVENTIL HFA,VENTOLIN HFA) 90 mcg/act inhaler Not Taking  No No   Sig: Inhale 2 puffs every 6 (six) hours as needed for wheezing   Patient not taking: Reported on 4/17/2022   ibuprofen (MOTRIN) 800 mg tablet Unknown  No No   Sig: Take 1 tablet (800 mg total) by mouth every 8 (eight) hours as needed for mild pain   lisinopril (ZESTRIL) 20 mg tablet Unknown  Yes No   Sig: Take 20 mg by mouth in the morning.      Facility-Administered Medications: None       Portions of the record may have been created with voice recognition software. Occasional wrong word or \"sound a like\" substitutions may have occurred due to the inherent limitations of voice recognition software. Read the chart carefully and recognize, using context, where substitutions have occurred.       ED Course         Critical Care Time  Procedures      "

## 2024-09-13 NOTE — PROGRESS NOTES
Bryant Lazcano is a 44 y.o. male who is currently ordered Vancomycin IV with management by the Pharmacy Consult service.  Relevant clinical data and objective / subjective history reviewed.  Vancomycin Assessment:  Indication and Goal AUC/Trough: Bacteremia (goal -600, trough >10)  Clinical Status: stable  Micro:     Renal Function:  SCr: 0.6 mg/dL  CrCl: >125 mL/min  Renal replacement: Not on dialysis  Days of Therapy: 1  Current Dose: 1.5gm q12  Vancomycin Plan:  New Dosing:    Estimated AUC: 424 mcg*hr/mL  Estimated Trough: 10.4 mcg/mL  Next Level: 9-14  Renal Function Monitoring: Daily BMP and UOP  Pharmacy will continue to follow closely for s/sx of nephrotoxicity, infusion reactions and appropriateness of therapy.  BMP and CBC will be ordered per protocol. We will continue to follow the patient’s culture results and clinical progress daily.    Reinaldo Schmitz, Pharmacist

## 2024-09-14 ENCOUNTER — APPOINTMENT (OUTPATIENT)
Dept: RADIOLOGY | Facility: HOSPITAL | Age: 44
DRG: 773 | End: 2024-09-14
Payer: COMMERCIAL

## 2024-09-14 LAB
ALBUMIN SERPL BCG-MCNC: 4.5 G/DL (ref 3.5–5)
ALP SERPL-CCNC: 79 U/L (ref 34–104)
ALT SERPL W P-5'-P-CCNC: 42 U/L (ref 7–52)
ANION GAP SERPL CALCULATED.3IONS-SCNC: 10 MMOL/L (ref 4–13)
AST SERPL W P-5'-P-CCNC: 30 U/L (ref 13–39)
BASOPHILS # BLD AUTO: 0.05 THOUSANDS/ΜL (ref 0–0.1)
BASOPHILS NFR BLD AUTO: 0 % (ref 0–1)
BILIRUB SERPL-MCNC: 1.82 MG/DL (ref 0.2–1)
BUN SERPL-MCNC: 12 MG/DL (ref 5–25)
CALCIUM SERPL-MCNC: 9.6 MG/DL (ref 8.4–10.2)
CHLORIDE SERPL-SCNC: 102 MMOL/L (ref 96–108)
CO2 SERPL-SCNC: 27 MMOL/L (ref 21–32)
CREAT SERPL-MCNC: 0.66 MG/DL (ref 0.6–1.3)
EOSINOPHIL # BLD AUTO: 0.01 THOUSAND/ΜL (ref 0–0.61)
EOSINOPHIL NFR BLD AUTO: 0 % (ref 0–6)
ERYTHROCYTE [DISTWIDTH] IN BLOOD BY AUTOMATED COUNT: 11.9 % (ref 11.6–15.1)
GFR SERPL CREATININE-BSD FRML MDRD: 116 ML/MIN/1.73SQ M
GLUCOSE SERPL-MCNC: 148 MG/DL (ref 65–140)
GLUCOSE SERPL-MCNC: 178 MG/DL (ref 65–140)
HCT VFR BLD AUTO: 41.6 % (ref 36.5–49.3)
HGB BLD-MCNC: 15.2 G/DL (ref 12–17)
IMM GRANULOCYTES # BLD AUTO: 0.06 THOUSAND/UL (ref 0–0.2)
IMM GRANULOCYTES NFR BLD AUTO: 0 % (ref 0–2)
LYMPHOCYTES # BLD AUTO: 2.65 THOUSANDS/ΜL (ref 0.6–4.47)
LYMPHOCYTES NFR BLD AUTO: 15 % (ref 14–44)
MCH RBC QN AUTO: 31.1 PG (ref 26.8–34.3)
MCHC RBC AUTO-ENTMCNC: 36.5 G/DL (ref 31.4–37.4)
MCV RBC AUTO: 85 FL (ref 82–98)
MONOCYTES # BLD AUTO: 1.2 THOUSAND/ΜL (ref 0.17–1.22)
MONOCYTES NFR BLD AUTO: 7 % (ref 4–12)
NEUTROPHILS # BLD AUTO: 13.99 THOUSANDS/ΜL (ref 1.85–7.62)
NEUTS SEG NFR BLD AUTO: 78 % (ref 43–75)
NRBC BLD AUTO-RTO: 0 /100 WBCS
PLATELET # BLD AUTO: 201 THOUSANDS/UL (ref 149–390)
PMV BLD AUTO: 10.8 FL (ref 8.9–12.7)
POTASSIUM SERPL-SCNC: 3.1 MMOL/L (ref 3.5–5.3)
PROT SERPL-MCNC: 8.5 G/DL (ref 6.4–8.4)
RBC # BLD AUTO: 4.88 MILLION/UL (ref 3.88–5.62)
SODIUM SERPL-SCNC: 139 MMOL/L (ref 135–147)
VANCOMYCIN SERPL-MCNC: 4.3 UG/ML (ref 10–20)
WBC # BLD AUTO: 17.96 THOUSAND/UL (ref 4.31–10.16)

## 2024-09-14 PROCEDURE — 72156 MRI NECK SPINE W/O & W/DYE: CPT

## 2024-09-14 PROCEDURE — 82948 REAGENT STRIP/BLOOD GLUCOSE: CPT

## 2024-09-14 PROCEDURE — 80202 ASSAY OF VANCOMYCIN: CPT | Performed by: INTERNAL MEDICINE

## 2024-09-14 PROCEDURE — 85025 COMPLETE CBC W/AUTO DIFF WBC: CPT | Performed by: INTERNAL MEDICINE

## 2024-09-14 PROCEDURE — 99232 SBSQ HOSP IP/OBS MODERATE 35: CPT | Performed by: INTERNAL MEDICINE

## 2024-09-14 PROCEDURE — A9585 GADOBUTROL INJECTION: HCPCS | Performed by: INTERNAL MEDICINE

## 2024-09-14 PROCEDURE — 80053 COMPREHEN METABOLIC PANEL: CPT | Performed by: INTERNAL MEDICINE

## 2024-09-14 RX ORDER — CLONIDINE HYDROCHLORIDE 0.1 MG/1
0.1 TABLET ORAL EVERY 8 HOURS SCHEDULED
Status: DISCONTINUED | OUTPATIENT
Start: 2024-09-14 | End: 2024-09-15

## 2024-09-14 RX ORDER — GADOBUTROL 604.72 MG/ML
10 INJECTION INTRAVENOUS
Status: COMPLETED | OUTPATIENT
Start: 2024-09-14 | End: 2024-09-14

## 2024-09-14 RX ORDER — HYDRALAZINE HYDROCHLORIDE 20 MG/ML
5 INJECTION INTRAMUSCULAR; INTRAVENOUS ONCE
Status: COMPLETED | OUTPATIENT
Start: 2024-09-14 | End: 2024-09-14

## 2024-09-14 RX ORDER — POTASSIUM CHLORIDE 1500 MG/1
40 TABLET, EXTENDED RELEASE ORAL ONCE
Status: COMPLETED | OUTPATIENT
Start: 2024-09-14 | End: 2024-09-14

## 2024-09-14 RX ORDER — DIAZEPAM 10 MG/2ML
10 INJECTION, SOLUTION INTRAMUSCULAR; INTRAVENOUS EVERY 6 HOURS PRN
Status: DISCONTINUED | OUTPATIENT
Start: 2024-09-14 | End: 2024-09-17 | Stop reason: HOSPADM

## 2024-09-14 RX ORDER — LABETALOL HYDROCHLORIDE 5 MG/ML
10 INJECTION, SOLUTION INTRAVENOUS EVERY 6 HOURS PRN
Status: DISCONTINUED | OUTPATIENT
Start: 2024-09-14 | End: 2024-09-17 | Stop reason: HOSPADM

## 2024-09-14 RX ORDER — METOCLOPRAMIDE HYDROCHLORIDE 5 MG/ML
10 INJECTION INTRAMUSCULAR; INTRAVENOUS ONCE
Status: COMPLETED | OUTPATIENT
Start: 2024-09-14 | End: 2024-09-14

## 2024-09-14 RX ORDER — LISINOPRIL 20 MG/1
20 TABLET ORAL DAILY
Status: DISCONTINUED | OUTPATIENT
Start: 2024-09-14 | End: 2024-09-17 | Stop reason: HOSPADM

## 2024-09-14 RX ADMIN — LISINOPRIL 20 MG: 20 TABLET ORAL at 01:12

## 2024-09-14 RX ADMIN — LISINOPRIL 20 MG: 20 TABLET ORAL at 10:52

## 2024-09-14 RX ADMIN — CEFEPIME 2000 MG: 2 INJECTION, POWDER, FOR SOLUTION INTRAVENOUS at 01:12

## 2024-09-14 RX ADMIN — ACETAMINOPHEN 975 MG: 325 TABLET, FILM COATED ORAL at 22:24

## 2024-09-14 RX ADMIN — GADOBUTROL 10 ML: 604.72 INJECTION INTRAVENOUS at 12:22

## 2024-09-14 RX ADMIN — ENOXAPARIN SODIUM 40 MG: 40 INJECTION SUBCUTANEOUS at 10:51

## 2024-09-14 RX ADMIN — TRAZODONE HYDROCHLORIDE 50 MG: 50 TABLET ORAL at 22:09

## 2024-09-14 RX ADMIN — GABAPENTIN 300 MG: 300 CAPSULE ORAL at 10:51

## 2024-09-14 RX ADMIN — NICOTINE 1 PATCH: 14 PATCH, EXTENDED RELEASE TRANSDERMAL at 10:54

## 2024-09-14 RX ADMIN — PANTOPRAZOLE SODIUM 40 MG: 40 INJECTION, POWDER, FOR SOLUTION INTRAVENOUS at 10:57

## 2024-09-14 RX ADMIN — LABETALOL HYDROCHLORIDE 10 MG: 5 INJECTION, SOLUTION INTRAVENOUS at 22:08

## 2024-09-14 RX ADMIN — DIAZEPAM 10 MG: 10 INJECTION, SOLUTION INTRAMUSCULAR; INTRAVENOUS at 20:54

## 2024-09-14 RX ADMIN — VANCOMYCIN HYDROCHLORIDE 1500 MG: 1 INJECTION, POWDER, LYOPHILIZED, FOR SOLUTION INTRAVENOUS at 12:30

## 2024-09-14 RX ADMIN — CYCLOBENZAPRINE HYDROCHLORIDE 10 MG: 10 TABLET, FILM COATED ORAL at 22:09

## 2024-09-14 RX ADMIN — POTASSIUM CHLORIDE 40 MEQ: 1500 TABLET, EXTENDED RELEASE ORAL at 22:08

## 2024-09-14 RX ADMIN — CLONIDINE HYDROCHLORIDE 0.1 MG: 0.1 TABLET ORAL at 22:09

## 2024-09-14 RX ADMIN — ACETAMINOPHEN 975 MG: 325 TABLET, FILM COATED ORAL at 06:05

## 2024-09-14 RX ADMIN — CLONIDINE HYDROCHLORIDE 0.1 MG: 0.1 TABLET ORAL at 15:35

## 2024-09-14 RX ADMIN — CYCLOBENZAPRINE HYDROCHLORIDE 10 MG: 10 TABLET, FILM COATED ORAL at 10:50

## 2024-09-14 RX ADMIN — POTASSIUM CHLORIDE 40 MEQ: 1500 TABLET, EXTENDED RELEASE ORAL at 17:52

## 2024-09-14 RX ADMIN — DIAZEPAM 5 MG: 10 INJECTION, SOLUTION INTRAMUSCULAR; INTRAVENOUS at 03:13

## 2024-09-14 RX ADMIN — CLONIDINE HYDROCHLORIDE 0.1 MG: 0.1 TABLET ORAL at 10:50

## 2024-09-14 RX ADMIN — CEFEPIME 2000 MG: 2 INJECTION, POWDER, FOR SOLUTION INTRAVENOUS at 17:53

## 2024-09-14 RX ADMIN — DIAZEPAM 5 MG: 10 INJECTION, SOLUTION INTRAMUSCULAR; INTRAVENOUS at 11:01

## 2024-09-14 RX ADMIN — ACETAMINOPHEN 975 MG: 325 TABLET, FILM COATED ORAL at 15:35

## 2024-09-14 RX ADMIN — METOCLOPRAMIDE 10 MG: 5 INJECTION, SOLUTION INTRAMUSCULAR; INTRAVENOUS at 00:17

## 2024-09-14 RX ADMIN — CEFEPIME 2000 MG: 2 INJECTION, POWDER, FOR SOLUTION INTRAVENOUS at 09:00

## 2024-09-14 RX ADMIN — VANCOMYCIN HYDROCHLORIDE 1250 MG: 10 INJECTION, POWDER, LYOPHILIZED, FOR SOLUTION INTRAVENOUS at 18:21

## 2024-09-14 RX ADMIN — GABAPENTIN 300 MG: 300 CAPSULE ORAL at 17:52

## 2024-09-14 RX ADMIN — HYDRALAZINE HYDROCHLORIDE 5 MG: 20 INJECTION INTRAMUSCULAR; INTRAVENOUS at 03:13

## 2024-09-14 RX ADMIN — CYCLOBENZAPRINE HYDROCHLORIDE 10 MG: 10 TABLET, FILM COATED ORAL at 17:52

## 2024-09-14 RX ADMIN — GABAPENTIN 300 MG: 300 CAPSULE ORAL at 22:09

## 2024-09-14 NOTE — QUICK NOTE
Attempted to see patient but he was JOSIANE at MRI.  Cultures remain negative.  Afebrile with WBC remains elevated.  CT chest showed not acute abnormality.  Will continue antibiotics for now, follow up final blood cultures and MRI c-spine.  I will formally assess tomorrow.  Please call in the interim for any new questions.

## 2024-09-14 NOTE — PROGRESS NOTES
Progress Note - Hospitalist   Name: Bryant Lazcnao 44 y.o. male I MRN: [id number]  Unit/Bed#: [facility] 412-01 I Date of Admission: 9/13/2024   Date of Service: 9/14/2024 I Hospital Day: 1    Assessment & Plan  Chronic hepatitis C without hepatic coma (HCC)  Check updated labs-pending  IV drug user  Patient presenting with subjective fevers chills nausea chest discomfort diaphoresis, admits to ongoing IV drug abuse last use yesterday  Patient reports he was on long-term antibiotics for for infection in his spine  My primary suspicion is for a bacteremia, will cover with vancomycin , cefepime added for gram negative coverage  Check echocardiogram   Check CT chest due to ongoing chest discomfort to sternal area also MRI of cervical spine      Type 2 diabetes mellitus without complication, without long-term current use of insulin (HCC)  Lab Results   Component Value Date    HGBA1C 5.2 04/11/2024       Recent Labs     09/13/24 2043 09/14/24  0751   POCGLU 137 148*       Blood Sugar Average: Last 72 hrs:  (P) 142.5      VTE Pharmacologic Prophylaxis: VTE Score: 0 Moderate Risk (Score 3-4) - Pharmacological DVT Prophylaxis Ordered: enoxaparin (Lovenox).    Mobility:   Basic Mobility Inpatient Raw Score: 20  JH-HLM Goal: 6: Walk 10 steps or more  JH-HLM Achieved: 6: Walk 10 steps or more  JH-HLM Goal achieved. Continue to encourage appropriate mobility.    Patient Centered Rounds: I performed bedside rounds with nursing staff today.   Discussions with Specialists or Other Care Team Provider:     Education and Discussions with Family / Patient: Updated  (significant other) at bedside.    Current Length of Stay: 1 day(s)  Current Patient Status: Inpatient   Certification Statement: The patient will continue to require additional inpatient hospital stay due to blood cultures, iv abx  Discharge Plan: Anticipate discharge in >72 hrs to discharge location to be determined pending rehab evaluations.    Code Status:  Level 1 - Full Code    Subjective   Patient reports significantly improved symptoms over the past 24 hours. Still with sternal discomfort, however improved, neck pain improving    Objective     Vitals:   Temp (24hrs), Av °F (37.2 °C), Min:98 °F (36.7 °C), Max:100 °F (37.8 °C)    Temp:  [98 °F (36.7 °C)-100 °F (37.8 °C)] 98 °F (36.7 °C)  HR:  [73-96] 95  Resp:  [16-18] 18  BP: (131-194)/(68-95) 131/68  SpO2:  [95 %-99 %] 95 %  Body mass index is 31.57 kg/m².     Input and Output Summary (last 24 hours):     Intake/Output Summary (Last 24 hours) at 2024 181  Last data filed at 2024 0900  Gross per 24 hour   Intake 420 ml   Output --   Net 420 ml       Physical Exam  Constitutional:       General: He is not in acute distress.     Appearance: He is not toxic-appearing or diaphoretic.   Eyes:      General: No scleral icterus.  Cardiovascular:      Rate and Rhythm: Normal rate and regular rhythm.      Heart sounds: No murmur heard.     No friction rub. No gallop.   Pulmonary:      Effort: No respiratory distress.      Breath sounds: No stridor. No wheezing, rhonchi or rales.   Chest:      Chest wall: No tenderness.   Abdominal:      General: There is no distension.      Palpations: There is no mass.      Tenderness: There is no abdominal tenderness. There is no guarding or rebound.      Hernia: No hernia is present.   Musculoskeletal:         General: Tenderness present. No swelling.   Skin:     Coloration: Skin is not jaundiced or pale.   Neurological:      Mental Status: He is oriented to person, place, and time.          Lines/Drains:  Lines/Drains/Airways       Active Status       None                      Telemetry:  Telemetry Orders (From admission, onward)               24 Hour Telemetry Monitoring  Continuous x 24 Hours (Telem)           Question:  Reason for 24 Hour Telemetry  Answer:  Patients with zeenat/arabella/endocarditis; cardiac contusion                                  Lab Results: I have  reviewed the following results: CBC/BMP:   .     09/14/24  1053   WBC 17.96*   HGB 15.2   HCT 41.6      SODIUM 139   K 3.1*      CO2 27   BUN 12   CREATININE 0.66   GLUC 178*       Results from last 7 days   Lab Units 09/14/24  1053   WBC Thousand/uL 17.96*   HEMOGLOBIN g/dL 15.2   HEMATOCRIT % 41.6   PLATELETS Thousands/uL 201   SEGS PCT % 78*   LYMPHO PCT % 15   MONO PCT % 7   EOS PCT % 0     Results from last 7 days   Lab Units 09/14/24  1053   SODIUM mmol/L 139   POTASSIUM mmol/L 3.1*   CHLORIDE mmol/L 102   CO2 mmol/L 27   BUN mg/dL 12   CREATININE mg/dL 0.66   ANION GAP mmol/L 10   CALCIUM mg/dL 9.6   ALBUMIN g/dL 4.5   TOTAL BILIRUBIN mg/dL 1.82*   ALK PHOS U/L 79   ALT U/L 42   AST U/L 30   GLUCOSE RANDOM mg/dL 178*     Results from last 7 days   Lab Units 09/13/24  0616   INR  1.01     Results from last 7 days   Lab Units 09/14/24  0751 09/13/24  2043   POC GLUCOSE mg/dl 148* 137         Results from last 7 days   Lab Units 09/13/24  0640 09/13/24  0616   LACTIC ACID mmol/L 1.8  --    PROCALCITONIN ng/ml  --  0.07       Recent Cultures (last 7 days):   Results from last 7 days   Lab Units 09/13/24  0928 09/13/24  0640 09/13/24  0631   BLOOD CULTURE  No Growth at 24 hrs. No Growth at 24 hrs. No Growth at 24 hrs.       Imaging Review: No pertinent imaging studies reviewed.  Other Studies: No additional pertinent studies reviewed.    Last 24 Hours Medication List:     Current Facility-Administered Medications:     acetaminophen (TYLENOL) tablet 975 mg, Q8H MITZI    ceFEPime (MAXIPIME) 2,000 mg in dextrose 5 % 50 mL IVPB, Q8H, Last Rate: 2,000 mg (09/14/24 1753)    cloNIDine (CATAPRES) tablet 0.1 mg, Q8H MITZI    cyclobenzaprine (FLEXERIL) tablet 10 mg, TID    diazepam (VALIUM) injection 10 mg, Q6H PRN    doxylamine (UNISOM) tablet 25 mg, HS PRN    enoxaparin (LOVENOX) subcutaneous injection 40 mg, Daily    gabapentin (NEURONTIN) capsule 300 mg, TID    ketorolac (TORADOL) injection 15 mg, Q6H PRN     labetalol (NORMODYNE) injection 10 mg, Q6H PRN    lisinopril (ZESTRIL) tablet 20 mg, Daily    nicotine (NICODERM CQ) 14 mg/24hr TD 24 hr patch 1 patch, Daily    ondansetron (ZOFRAN) injection 4 mg, Q6H PRN    pantoprazole (PROTONIX) injection 40 mg, Q24H MITZI    potassium chloride (Klor-Con M20) CR tablet 40 mEq, Once    traZODone (DESYREL) tablet 50 mg, HS    vancomycin (VANCOCIN) 1,250 mg in sodium chloride 0.9 % 250 mL IVPB, Q8H    Administrative Statements   Today, Patient Was Seen By: Emir David DO      **Please Note: This note may have been constructed using a voice recognition system.**

## 2024-09-14 NOTE — ASSESSMENT & PLAN NOTE
Patient presenting with subjective fevers chills nausea chest discomfort diaphoresis, admits to ongoing IV drug abuse last use yesterday  Patient reports he was on long-term antibiotics for for infection in his spine  My primary suspicion is for a bacteremia, will cover with vancomycin , cefepime added for gram negative coverage  Check echocardiogram   Check CT chest due to ongoing chest discomfort to sternal area also MRI of cervical spine

## 2024-09-14 NOTE — PROGRESS NOTES
Bryant Lazcano is a 44 y.o. male who is currently ordered Vancomycin IV with management by the Pharmacy Consult service.  Relevant clinical data and objective / subjective history reviewed.  Vancomycin Assessment:  Indication and Goal AUC/Trough: Bacteremia (goal -600, trough >10)  Clinical Status: stable  Micro:     Renal Function:  SCr: 0.66 mg/dL  CrCl: 167 mL/min  Renal replacement: Not on dialysis  Days of Therapy: 2  Current Dose: 1.5gm q12  Vancomycin Plan:  New Dosin mg IV q8h  Estimated AUC: 458 mcg*hr/mL  Estimated Trough: 12.6 mcg/mL  Next Level: 9/15 am labs   Renal Function Monitoring: Daily BMP and UOP  Pharmacy will continue to follow closely for s/sx of nephrotoxicity, infusion reactions and appropriateness of therapy.  BMP and CBC will be ordered per protocol. We will continue to follow the patient’s culture results and clinical progress daily.    Bryant Parra, Pharmacist

## 2024-09-14 NOTE — UTILIZATION REVIEW
Initial Clinical Review    Admission: Date/Time/Statement:   Admission Orders (From admission, onward)       Ordered        09/13/24 1205  INPATIENT ADMISSION  Once                          Orders Placed This Encounter   Procedures    INPATIENT ADMISSION     Standing Status:   Standing     Number of Occurrences:   1     Order Specific Question:   Level of Care     Answer:   Med Surg [16]     Order Specific Question:   Estimated length of stay     Answer:   More than 2 Midnights     Order Specific Question:   Certification     Answer:   I certify that inpatient services are medically necessary for this patient for a duration of greater than two midnights. See H&P and MD Progress Notes for additional information about the patient's course of treatment.     ED Arrival Information       Expected   9/13/2024     Arrival   9/13/2024 05:37    Acuity   Urgent              Means of arrival   Ambulance    Escorted by   [facility]    Service   Hospitalist    Admission type   Emergency              Arrival complaint   Dizziness             Chief Complaint   Patient presents with    Dizziness     Pt BIBA from home. Pt c/o dizziness that started yesterday morning and has been off and on along with chest pain for the last couple days. Pt reports relapsing in the last month using oxycodone after being clean for about 10 years.        Initial Presentation: 44 y.o. male who presented by EMS to [facility] ED. Admitted as Inpatient for evaluation and treatment of chest pain. PMHx: asthma, Hep C, substance use (IV - last used several hours prior to ED presentation). Presented w/ chills, body aches, chest pain. On exam, ill-appearing, rigors. Plan: check CBC, CMP, lactic acid, follow blood cultures, UA, CXR to r/o penumonia, IVF, check EKG and troponin, telemetry, I&O, DW, COWS monitoring, echo, SSI w/ BG checks ACHS. Infectious Diseases consulted.    Infectious Diseases: sepsis. Concern for  bacteremia vs infective endocarditis given active IVDU. Check CT chest, blood cultures, echo, MRI c-spine. IV vancomycin, IV cefepime 2g q8h, Trend labs, replete electrolytes as needed. Check Hep C & HIV.    Date: 09/14/24   Day 2: Reports improved symptoms, remains w/ sternal discomfort but improved. Exam: generalized tenderness. K 3.1. WBC 17.96. Plan: IV vancomycin and cefepime, check echo, CT chest, MRI c-spine. SSI w/ BG checks ACHS. COWS monitoring. Clonidine q8h, telemetry, DW, I&O. Trend labs, replete electrolytes as needed.    diazepam, 5 mg, Intravenous, Q6H PRN; 9/13 x2, 9/14 x2  diazepam, 5 mg, Oral; 9/13 x2  hydrALAZINE, 5 mg, Intravenous; 9/14 x1  ketorolac, 15 mg, Intravenous, Q6H PRN; 9/13 x2  lactated ringers, 1,000 mL bolus, Intravenous; 9/13 x1  metoclopramide, 10 mg, Intravenous; 9/14 x1  morphine, 2 mg, Intravenous; 9/13 x1  ondansetron, 4 mg, Intravenous, Q6H PRN; 9/13 x2  sodium chloride 0.9 %, 1,000 mL bolus, Intravenous, 9/13 x1    ED Triage Vitals   Temperature Pulse Respirations Blood Pressure SpO2 Pain Score   09/13/24 0540 09/13/24 0540 09/13/24 0540 09/13/24 0540 09/13/24 0540 09/13/24 0817   98.9 °F (37.2 °C) (!) 120 18 (!) 181/113 98 % 10 - Worst Possible Pain     Weight (last 2 days)       Date/Time Weight    09/13/24 1400 99.8 (220)    09/13/24 1349 54.4 (120)            Vital Signs (last 3 days)       Date/Time Temp Pulse Resp BP MAP (mmHg) SpO2 O2 Device Patient Position - Orthostatic VS Junito Coma Scale Score Clinical Opiate Withdrawal Scale Total Score Pain    09/14/24 0700 98.9 °F (37.2 °C) 93 18 181/79 111 96 % None (Room air) Lying -- 7 --    09/14/24 0605 -- -- -- -- -- -- -- -- -- -- 8    09/14/24 0229 99.9 °F (37.7 °C) 96 18 194/90 135 99 % -- Lying -- 8 --    09/14/24 0020 100 °F (37.8 °C) -- -- -- -- -- -- -- -- -- --    09/14/24 0000 -- -- -- -- -- -- -- -- -- -- No Pain    09/13/24 2359 -- 82 16 183/87 109 -- -- Lying -- -- --    09/13/24 2300 99 °F (37.2 °C) 73 --  181/94 141 99 % None (Room air) Lying -- -- --    09/13/24 2122 -- -- -- -- -- -- -- -- -- -- 8    09/13/24 2010 98.2 °F (36.8 °C) 92 -- 170/80 -- -- -- Lying -- 10 --    09/13/24 2000 -- -- -- -- -- -- -- -- 15 -- --    09/13/24 1400 -- 98 -- 137/72 -- -- -- -- -- -- --    09/13/24 1349 98.9 °F (37.2 °C) 98 18 137/72 -- -- -- -- -- -- --    09/13/24 1346 -- -- -- -- -- -- -- -- -- -- 5    09/13/24 1330 -- -- -- -- -- -- -- -- 15 -- --    09/13/24 1249 98.7 °F (37.1 °C) -- -- -- -- -- -- -- -- -- --    09/13/24 1151 -- -- -- -- -- -- -- -- -- -- 10 - Worst Possible Pain    09/13/24 1100 -- 75 18 146/67 97 98 % None (Room air) Lying -- -- --    09/13/24 1000 -- 90 22 141/77 103 95 % None (Room air) Lying -- -- --    09/13/24 0900 -- 87 16 137/61 88 96 % None (Room air) -- -- -- --    09/13/24 0817 -- -- -- -- -- -- -- -- -- -- 10 - Worst Possible Pain    09/13/24 0726 -- -- -- -- -- -- -- -- 15 -- --    09/13/24 0700 -- 79 13 172/89 123 96 % None (Room air) -- -- -- --    09/13/24 0600 -- 109 22 170/101 125 98 % None (Room air) Lying -- -- --    09/13/24 0543 -- -- -- -- -- -- -- -- 15 -- --    09/13/24 0540 98.9 °F (37.2 °C) 120 18 181/113 136 98 % None (Room air) Lying -- -- --              Pertinent Labs/Diagnostic Test Results:   Radiology:  CTA chest pe study   Final Interpretation by Shante Archibald MD (09/13 1826)         1. No central pulmonary embolism. Limited study.   2. Mosaic pattern which could reflect air trapping.                  Workstation performed: [id number]         XR chest portable   Final Interpretation by Zak Sal MD (09/13 5276)      No acute cardiopulmonary disease.            Resident: Bola Carroll I, the attending radiologist, have reviewed the images and agree with the final report above.      Workstation performed: [id number]         MRI cervical spine w wo contrast    (Results Pending)     Cardiology:  Echo complete w/ contrast if indicated   Final  Result by Bryant De La Vega MD (09/13 1605)        Left Ventricle: Left ventricular cavity size is normal. Wall thickness    is mildly increased. The left ventricular ejection fraction is 65%.    Systolic function is normal. Wall motion is normal. Diastolic function is    mildly abnormal, consistent with grade I (abnormal) relaxation.     Aortic Valve: There is trace regurgitation.     Mitral Valve: There is trace regurgitation.     Tricuspid Valve: There is mild regurgitation.     Aorta: The aortic root is normal in size. The ascending aorta is mildly    dilated.     No echocardiographic evidence of endocarditis.  If clinical suspicion    is high suggest LOY.               Results from last 7 days   Lab Units 09/13/24  0811   SARS-COV-2  Negative     Results from last 7 days   Lab Units 09/14/24  1053 09/13/24  0647   WBC Thousand/uL 17.96* 15.04*   HEMOGLOBIN g/dL 15.2 16.2   HEMATOCRIT % 41.6 44.9   PLATELETS Thousands/uL 201 209   TOTAL NEUT ABS Thousands/µL 13.99* 11.61*         Results from last 7 days   Lab Units 09/14/24  1053 09/13/24  0616   SODIUM mmol/L 139 136   POTASSIUM mmol/L 3.1* 3.6   CHLORIDE mmol/L 102 102   CO2 mmol/L 27 19*   ANION GAP mmol/L 10 15*   BUN mg/dL 12 8   CREATININE mg/dL 0.66 0.64   EGFR ml/min/1.73sq m 116 118   CALCIUM mg/dL 9.6 9.6     Results from last 7 days   Lab Units 09/14/24  1053 09/13/24  0616   AST U/L 30 51*   ALT U/L 42 58*   ALK PHOS U/L 79 94   TOTAL PROTEIN g/dL 8.5* 8.9*   ALBUMIN g/dL 4.5 4.8   TOTAL BILIRUBIN mg/dL 1.82* 1.42*     Results from last 7 days   Lab Units 09/14/24  0751 09/13/24  2043   POC GLUCOSE mg/dl 148* 137     Results from last 7 days   Lab Units 09/14/24  1053 09/13/24  0616   GLUCOSE RANDOM mg/dL 178* 124      Results from last 7 days   Lab Units 09/13/24  1018 09/13/24  0803 09/13/24  0618   HS TNI 0HR ng/L  --   --  13   HS TNI 2HR ng/L  --  15  --    HSTNI D2 ng/L  --  2  --    HS TNI 4HR ng/L 14  --   --    HSTNI D4 ng/L 1  --   --           Results from last 7 days   Lab Units 09/13/24  0616   PROTIME seconds 13.6   INR  1.01   PTT seconds 24         Results from last 7 days   Lab Units 09/13/24  0616   PROCALCITONIN ng/ml 0.07     Results from last 7 days   Lab Units 09/13/24  0640   LACTIC ACID mmol/L 1.8      Results from last 7 days   Lab Units 09/13/24  0635   CLARITY UA  Clear   COLOR UA  Light Yellow   SPEC GRAV UA  1.008   PH UA  5.5   GLUCOSE UA mg/dl Negative   KETONES UA mg/dl 20 (1+)*   BLOOD UA  Negative   PROTEIN UA mg/dl Negative   NITRITE UA  Negative   BILIRUBIN UA  Negative   UROBILINOGEN UA (BE) mg/dl <2.0   LEUKOCYTES UA  Negative     Results from last 7 days   Lab Units 09/13/24  0811   INFLUENZA A PCR  Negative   INFLUENZA B PCR  Negative   RSV PCR  Negative      Results from last 7 days   Lab Units 09/13/24  0928 09/13/24  0640 09/13/24  0631   BLOOD CULTURE  No Growth at 24 hrs. No Growth at 24 hrs. No Growth at 24 hrs.      Results from last 7 days   Lab Units 09/14/24  1053   VANCOMYCIN RM ug/mL 4.3*       ED Treatment-Medication Administration from 09/13/2024 0537 to 09/13/2024 1302         Date/Time Order Dose Route Action     09/13/2024 0649 sodium chloride 0.9 % bolus 1,000 mL 1,000 mL Intravenous New Bag     09/13/2024 0621 diazepam (VALIUM) tablet 5 mg 5 mg Oral Given     09/13/2024 0928 cefepime (MAXIPIME) 2 g/50 mL dextrose IVPB 2,000 mg Intravenous New Bag     09/13/2024 1019 vancomycin (VANCOCIN) 2,000 mg in sodium chloride 0.9 % 500 mL IVPB 2,000 mg Intravenous New Bag     09/13/2024 0810 diazepam (VALIUM) tablet 5 mg 5 mg Oral Given     09/13/2024 0817 acetaminophen (TYLENOL) tablet 975 mg 975 mg Oral Given     09/13/2024 0817 ketorolac (TORADOL) injection 15 mg 15 mg Intravenous Given     09/13/2024 0818 methocarbamol (ROBAXIN) tablet 500 mg 500 mg Oral Given     09/13/2024 0818 lidocaine (LIDODERM) 5 % patch 1 patch 1 patch Topical Medication Applied     09/13/2024 1151 morphine injection 2 mg 2 mg Intravenous  Given     09/13/2024 1159 nicotine (NICODERM CQ) 21 mg/24 hr TD 24 hr patch 21 mg 21 mg Transdermal Medication Applied            Past Medical History:   Diagnosis Date    Asthma     Hepatitis C        Admitting Diagnosis: Chest pain [R07.9]  IV drug user [F19.90]  Age/Sex: 44 y.o. male  Admission Orders:  Scheduled Medications:  acetaminophen, 975 mg, Oral, Q8H MITZI  cefepime, 2,000 mg, Intravenous, Q8H  cloNIDine, 0.1 mg, Oral, Q12H MITZI  cyclobenzaprine, 10 mg, Oral, TID  enoxaparin, 40 mg, Subcutaneous, Daily  gabapentin, 300 mg, Oral, TID  lisinopril, 20 mg, Oral, Daily  nicotine, 1 patch, Transdermal, Daily  pantoprazole, 40 mg, Intravenous, Q24H MITZI  traZODone, 50 mg, Oral, HS  vancomycin, 1,500 mg, Intravenous, Q12H    Continuous IV Infusions: none    PRN Meds:  diazepam, 5 mg, Intravenous, Q6H PRN; 9/13 x2, 9/14 x2  diazepam, 5 mg, Oral; 9/13 x2  doxylamine, 25 mg, Oral, HS PRN  hydrALAZINE, 5 mg, Intravenous; 9/14 x1  ketorolac, 15 mg, Intravenous, Q6H PRN; 9/13 x2  lactated ringers, 1,000 mL bolus, Intravenous; 9/13 x1  metoclopramide, 10 mg, Intravenous; 9/14 x1  morphine, 2 mg, Intravenous; 9/13 x1  ondansetron, 4 mg, Intravenous, Q6H PRN; 9/13 x2  sodium chloride 0.9 %, 1,000 mL bolus, Intravenous, 9/13 x1    methocarbamol (ROBAXIN) tablet 500 mg  Dose: 500 mg  Freq: Once Route: PO  Start: 09/13/24 0815 End: 09/13/24 0818      IP CONSULT TO INFECTIOUS DISEASES  IP CONSULT TO PHARMACY    Network Utilization Review Department  ATTENTION: Please call with any questions or concerns to [phone] and carefully listen to the prompts so that you are directed to the right person. All voicemails are confidential.   For Discharge needs, contact Care Management DC Support Team at [phone] opt. 2  Send all requests for admission clinical reviews, approved or denied determinations and any other requests to dedicated fax number below belonging to the campus where the patient is receiving treatment. List of  dedicated fax numbers for the Facilities:  FACILITY NAME UR FAX NUMBER   ADMISSION DENIALS (Administrative/Medical Necessity) [phone]   DISCHARGE SUPPORT TEAM (NETWORK) [phone]   PARENT CHILD HEALTH (Maternity/NICU/Pediatrics) [phone]   [facility] [phone]   [facility] [phone]   [facility] [phone]   [facility] [phone]   [facility] [phone]   [facility] [phone]   [facility] [phone]   [facility] [phone]   [facility] [phone]   [facility] [phone]   [facility] [phone]   [facility] [phone]

## 2024-09-14 NOTE — ASSESSMENT & PLAN NOTE
Patient presenting with subjective fevers chills nausea chest discomfort diaphoresis, admits to ongoing IV drug abuse last use yesterday  Patient reports he was on long-term antibiotics for for infection in his spine  My primary suspicion is for a bacteremia, will cover with vancomycin for now  Check echocardiogram   Check CT chest due to ongoing chest discomfort to sternal area also MRI of cervical spine

## 2024-09-14 NOTE — ASSESSMENT & PLAN NOTE
Lab Results   Component Value Date    HGBA1C 5.2 04/11/2024       Recent Labs     09/13/24 2043 09/14/24  0751   POCGLU 137 148*       Blood Sugar Average: Last 72 hrs:  (P) 142.5

## 2024-09-14 NOTE — H&P
H&P - Hospitalist   Name: Bryant Lazcano 44 y.o. male I MRN: [id number]  Unit/Bed#: [facility] 412-01 I Date of Admission: 9/13/2024   Date of Service: 9/14/2024 I Hospital Day: 1     Assessment & Plan  Chronic hepatitis C without hepatic coma (HCC)  Check updated labs  IV drug user  Patient presenting with subjective fevers chills nausea chest discomfort diaphoresis, admits to ongoing IV drug abuse last use yesterday  Patient reports he was on long-term antibiotics for for infection in his spine  My primary suspicion is for a bacteremia, will cover with vancomycin for now  Check echocardiogram   Check CT chest due to ongoing chest discomfort to sternal area also MRI of cervical spine      Type 2 diabetes mellitus without complication, without long-term current use of insulin (HCC)  Lab Results   Component Value Date    HGBA1C 5.2 04/11/2024       Recent Labs     09/13/24 2043 09/14/24  0751   POCGLU 137 148*       Blood Sugar Average: Last 72 hrs:  (P) 142.5      VTE Pharmacologic Prophylaxis: VTE Score: 0 Moderate Risk (Score 3-4) - Pharmacological DVT Prophylaxis Ordered: enoxaparin (Lovenox).  Code Status: Level 1 - Full Code   Discussion with family: Updated  (significant other) at bedside.    Anticipated Length of Stay: Patient will be admitted on an inpatient basis with an anticipated length of stay of greater than 2 midnights secondary to chills.    History of Present Illness   Chief Complaint: chills fevers    Bryant Lazcano is a 44 y.o. male with a PMH of drug abuse IV opioids, chronic hep C, type 2 diabetes who presents with fever chills discomfort and shortness of breath for the past 3 days.  He reports last using the morning prior to admission.  Patient was found to have a leukocytosis, mildly elevated liver enzymes, blood cultures were drawn and are currently pending, his COVID flu and RSV testing was negative.  My concern is for bacteremia in the setting of IV drug abuse.  Patient will be  "admitted pending further blood culture started on vancomycin and evaluated by infectious disease.     Review of Systems   Constitutional:  Positive for chills, diaphoresis, fatigue and fever.   Respiratory:  Positive for shortness of breath. Negative for apnea and wheezing.    Cardiovascular:  Positive for chest pain. Negative for palpitations and leg swelling.   Gastrointestinal:  Negative for abdominal distention, abdominal pain, diarrhea, nausea and vomiting.   Musculoskeletal:  Positive for neck pain. Negative for arthralgias.       I have reviewed the patient's PMH, PSH, Social History, Family History, Meds, and Allergies  Social History:  Marital Status: Single   Occupation:   Patient Pre-hospital Living Situation: Home  Patient Pre-hospital Level of Mobility: walks  Patient Pre-hospital Diet Restrictions:     Objective     Vitals:   Blood Pressure: (!) 179/74 (09/14/24 1248)  Pulse: 95 (09/14/24 1115)  Temperature: 98.7 °F (37.1 °C) (09/14/24 1115)  Temp Source: Oral (09/14/24 1115)  Respirations: 18 (09/14/24 1115)  Height: 5' 10\" (177.8 cm) (09/13/24 1400)  Weight - Scale: 99.8 kg (220 lb) (09/13/24 1400)  SpO2: 95 % (09/14/24 1115)    Physical Exam  Constitutional:       General: He is not in acute distress.     Appearance: He is ill-appearing and diaphoretic. He is not toxic-appearing.   HENT:      Right Ear: There is no impacted cerumen.      Left Ear: There is no impacted cerumen.   Eyes:      General: No scleral icterus.  Cardiovascular:      Heart sounds: No murmur heard.     No friction rub. No gallop.   Pulmonary:      Effort: No respiratory distress.      Breath sounds: No stridor. No wheezing, rhonchi or rales.   Chest:      Chest wall: No tenderness.   Abdominal:      General: There is no distension.      Palpations: There is no mass.      Tenderness: There is no abdominal tenderness. There is no guarding or rebound.      Hernia: No hernia is present.   Musculoskeletal:         General: No " swelling or tenderness.         Lines/Drains:  Lines/Drains/Airways       Active Status       None                        Additional Data:   Lab Results: I have reviewed the following results: CBC/BMP:   .     09/14/24  1053   WBC 17.96*   HGB 15.2   HCT 41.6      SODIUM 139   K 3.1*      CO2 27   BUN 12   CREATININE 0.66   GLUC 178*      Results from last 7 days   Lab Units 09/14/24  1053   WBC Thousand/uL 17.96*   HEMOGLOBIN g/dL 15.2   HEMATOCRIT % 41.6   PLATELETS Thousands/uL 201   SEGS PCT % 78*   LYMPHO PCT % 15   MONO PCT % 7   EOS PCT % 0     Results from last 7 days   Lab Units 09/14/24  1053   SODIUM mmol/L 139   POTASSIUM mmol/L 3.1*   CHLORIDE mmol/L 102   CO2 mmol/L 27   BUN mg/dL 12   CREATININE mg/dL 0.66   ANION GAP mmol/L 10   CALCIUM mg/dL 9.6   ALBUMIN g/dL 4.5   TOTAL BILIRUBIN mg/dL 1.82*   ALK PHOS U/L 79   ALT U/L 42   AST U/L 30   GLUCOSE RANDOM mg/dL 178*     Results from last 7 days   Lab Units 09/13/24  0616   INR  1.01     Results from last 7 days   Lab Units 09/14/24  0751 09/13/24  2043   POC GLUCOSE mg/dl 148* 137     Lab Results   Component Value Date    HGBA1C 5.2 04/11/2024    HGBA1C 4.7 10/04/2018     Results from last 7 days   Lab Units 09/13/24  0640 09/13/24  0616   LACTIC ACID mmol/L 1.8  --    PROCALCITONIN ng/ml  --  0.07       Imaging Review: No pertinent imaging studies reviewed.  Other Studies: No additional pertinent studies reviewed.    Administrative Statements       ** Please Note: This note has been constructed using a voice recognition system. **

## 2024-09-15 LAB
ANION GAP SERPL CALCULATED.3IONS-SCNC: 8 MMOL/L (ref 4–13)
BASOPHILS # BLD AUTO: 0.08 THOUSANDS/ΜL (ref 0–0.1)
BASOPHILS NFR BLD AUTO: 1 % (ref 0–1)
BUN SERPL-MCNC: 12 MG/DL (ref 5–25)
CALCIUM SERPL-MCNC: 9.4 MG/DL (ref 8.4–10.2)
CHLORIDE SERPL-SCNC: 106 MMOL/L (ref 96–108)
CO2 SERPL-SCNC: 25 MMOL/L (ref 21–32)
CREAT SERPL-MCNC: 0.58 MG/DL (ref 0.6–1.3)
EOSINOPHIL # BLD AUTO: 0.17 THOUSAND/ΜL (ref 0–0.61)
EOSINOPHIL NFR BLD AUTO: 1 % (ref 0–6)
ERYTHROCYTE [DISTWIDTH] IN BLOOD BY AUTOMATED COUNT: 12 % (ref 11.6–15.1)
GFR SERPL CREATININE-BSD FRML MDRD: 123 ML/MIN/1.73SQ M
GLUCOSE SERPL-MCNC: 165 MG/DL (ref 65–140)
HCT VFR BLD AUTO: 41.2 % (ref 36.5–49.3)
HGB BLD-MCNC: 14.4 G/DL (ref 12–17)
IMM GRANULOCYTES # BLD AUTO: 0.06 THOUSAND/UL (ref 0–0.2)
IMM GRANULOCYTES NFR BLD AUTO: 0 % (ref 0–2)
LYMPHOCYTES # BLD AUTO: 3.73 THOUSANDS/ΜL (ref 0.6–4.47)
LYMPHOCYTES NFR BLD AUTO: 26 % (ref 14–44)
MCH RBC QN AUTO: 30.4 PG (ref 26.8–34.3)
MCHC RBC AUTO-ENTMCNC: 35 G/DL (ref 31.4–37.4)
MCV RBC AUTO: 87 FL (ref 82–98)
MONOCYTES # BLD AUTO: 0.96 THOUSAND/ΜL (ref 0.17–1.22)
MONOCYTES NFR BLD AUTO: 7 % (ref 4–12)
NEUTROPHILS # BLD AUTO: 9.29 THOUSANDS/ΜL (ref 1.85–7.62)
NEUTS SEG NFR BLD AUTO: 65 % (ref 43–75)
NRBC BLD AUTO-RTO: 0 /100 WBCS
PLATELET # BLD AUTO: 175 THOUSANDS/UL (ref 149–390)
PMV BLD AUTO: 10.2 FL (ref 8.9–12.7)
POTASSIUM SERPL-SCNC: 3.4 MMOL/L (ref 3.5–5.3)
RBC # BLD AUTO: 4.74 MILLION/UL (ref 3.88–5.62)
SODIUM SERPL-SCNC: 139 MMOL/L (ref 135–147)
VANCOMYCIN SERPL-MCNC: 16.1 UG/ML (ref 10–20)
WBC # BLD AUTO: 14.29 THOUSAND/UL (ref 4.31–10.16)

## 2024-09-15 PROCEDURE — 85025 COMPLETE CBC W/AUTO DIFF WBC: CPT | Performed by: INTERNAL MEDICINE

## 2024-09-15 PROCEDURE — 80048 BASIC METABOLIC PNL TOTAL CA: CPT | Performed by: INTERNAL MEDICINE

## 2024-09-15 PROCEDURE — 99232 SBSQ HOSP IP/OBS MODERATE 35: CPT | Performed by: INTERNAL MEDICINE

## 2024-09-15 PROCEDURE — 80202 ASSAY OF VANCOMYCIN: CPT | Performed by: INTERNAL MEDICINE

## 2024-09-15 RX ORDER — VANCOMYCIN HYDROCHLORIDE 1 G/200ML
1000 INJECTION, SOLUTION INTRAVENOUS EVERY 8 HOURS
Status: DISCONTINUED | OUTPATIENT
Start: 2024-09-15 | End: 2024-09-15

## 2024-09-15 RX ORDER — HYDRALAZINE HYDROCHLORIDE 25 MG/1
25 TABLET, FILM COATED ORAL EVERY 12 HOURS
Status: DISCONTINUED | OUTPATIENT
Start: 2024-09-15 | End: 2024-09-17 | Stop reason: HOSPADM

## 2024-09-15 RX ORDER — POTASSIUM CHLORIDE 1500 MG/1
40 TABLET, EXTENDED RELEASE ORAL ONCE
Status: COMPLETED | OUTPATIENT
Start: 2024-09-15 | End: 2024-09-15

## 2024-09-15 RX ORDER — BUPRENORPHINE AND NALOXONE 2; .5 MG/1; MG/1
2 FILM, SOLUBLE BUCCAL; SUBLINGUAL ONCE
Status: COMPLETED | OUTPATIENT
Start: 2024-09-15 | End: 2024-09-15

## 2024-09-15 RX ORDER — CLONIDINE HYDROCHLORIDE 0.2 MG/1
0.2 TABLET ORAL EVERY 8 HOURS SCHEDULED
Status: DISCONTINUED | OUTPATIENT
Start: 2024-09-15 | End: 2024-09-17 | Stop reason: HOSPADM

## 2024-09-15 RX ORDER — HYDRALAZINE HYDROCHLORIDE 20 MG/ML
5 INJECTION INTRAMUSCULAR; INTRAVENOUS ONCE
Status: COMPLETED | OUTPATIENT
Start: 2024-09-15 | End: 2024-09-15

## 2024-09-15 RX ADMIN — CYCLOBENZAPRINE HYDROCHLORIDE 10 MG: 10 TABLET, FILM COATED ORAL at 21:32

## 2024-09-15 RX ADMIN — LISINOPRIL 20 MG: 20 TABLET ORAL at 08:57

## 2024-09-15 RX ADMIN — DIAZEPAM 10 MG: 10 INJECTION, SOLUTION INTRAMUSCULAR; INTRAVENOUS at 03:56

## 2024-09-15 RX ADMIN — DIAZEPAM 10 MG: 10 INJECTION, SOLUTION INTRAMUSCULAR; INTRAVENOUS at 09:45

## 2024-09-15 RX ADMIN — HYDRALAZINE HYDROCHLORIDE 25 MG: 25 TABLET ORAL at 12:59

## 2024-09-15 RX ADMIN — CEFEPIME 2000 MG: 2 INJECTION, POWDER, FOR SOLUTION INTRAVENOUS at 00:48

## 2024-09-15 RX ADMIN — POTASSIUM CHLORIDE 40 MEQ: 1500 TABLET, EXTENDED RELEASE ORAL at 11:39

## 2024-09-15 RX ADMIN — BUPRENORPHINE AND NALOXONE 2 MG: 2; .5 FILM BUCCAL; SUBLINGUAL at 17:53

## 2024-09-15 RX ADMIN — CLONIDINE HYDROCHLORIDE 0.2 MG: 0.2 TABLET ORAL at 15:56

## 2024-09-15 RX ADMIN — VANCOMYCIN HYDROCHLORIDE 1250 MG: 10 INJECTION, POWDER, LYOPHILIZED, FOR SOLUTION INTRAVENOUS at 09:44

## 2024-09-15 RX ADMIN — CEFEPIME 2000 MG: 2 INJECTION, POWDER, FOR SOLUTION INTRAVENOUS at 08:56

## 2024-09-15 RX ADMIN — CLONIDINE HYDROCHLORIDE 0.1 MG: 0.1 TABLET ORAL at 06:03

## 2024-09-15 RX ADMIN — CLONIDINE HYDROCHLORIDE 0.2 MG: 0.2 TABLET ORAL at 21:32

## 2024-09-15 RX ADMIN — PANTOPRAZOLE SODIUM 40 MG: 40 INJECTION, POWDER, FOR SOLUTION INTRAVENOUS at 08:56

## 2024-09-15 RX ADMIN — VANCOMYCIN HYDROCHLORIDE 1250 MG: 10 INJECTION, POWDER, LYOPHILIZED, FOR SOLUTION INTRAVENOUS at 02:20

## 2024-09-15 RX ADMIN — LABETALOL HYDROCHLORIDE 10 MG: 5 INJECTION, SOLUTION INTRAVENOUS at 06:16

## 2024-09-15 RX ADMIN — LABETALOL HYDROCHLORIDE 10 MG: 5 INJECTION, SOLUTION INTRAVENOUS at 15:58

## 2024-09-15 RX ADMIN — GABAPENTIN 300 MG: 300 CAPSULE ORAL at 21:32

## 2024-09-15 RX ADMIN — DIAZEPAM 10 MG: 10 INJECTION, SOLUTION INTRAMUSCULAR; INTRAVENOUS at 21:32

## 2024-09-15 RX ADMIN — ACETAMINOPHEN 975 MG: 325 TABLET, FILM COATED ORAL at 06:03

## 2024-09-15 RX ADMIN — GABAPENTIN 300 MG: 300 CAPSULE ORAL at 08:56

## 2024-09-15 RX ADMIN — ACETAMINOPHEN 975 MG: 325 TABLET, FILM COATED ORAL at 15:56

## 2024-09-15 RX ADMIN — ENOXAPARIN SODIUM 40 MG: 40 INJECTION SUBCUTANEOUS at 08:56

## 2024-09-15 RX ADMIN — ACETAMINOPHEN 975 MG: 325 TABLET, FILM COATED ORAL at 21:32

## 2024-09-15 RX ADMIN — GABAPENTIN 300 MG: 300 CAPSULE ORAL at 15:56

## 2024-09-15 RX ADMIN — HYDRALAZINE HYDROCHLORIDE 5 MG: 20 INJECTION INTRAMUSCULAR; INTRAVENOUS at 00:49

## 2024-09-15 RX ADMIN — CYCLOBENZAPRINE HYDROCHLORIDE 10 MG: 10 TABLET, FILM COATED ORAL at 08:56

## 2024-09-15 RX ADMIN — NICOTINE 1 PATCH: 14 PATCH, EXTENDED RELEASE TRANSDERMAL at 08:58

## 2024-09-15 RX ADMIN — TRAZODONE HYDROCHLORIDE 50 MG: 50 TABLET ORAL at 21:33

## 2024-09-15 RX ADMIN — CYCLOBENZAPRINE HYDROCHLORIDE 10 MG: 10 TABLET, FILM COATED ORAL at 15:56

## 2024-09-15 NOTE — UTILIZATION REVIEW
NOTIFICATION OF INPATIENT ADMISSION   AUTHORIZATION REQUEST   SERVICING FACILITY:   [facility]  Address: [address] [place] [address]  Tax ID: [id number]  NPI: [id number] ATTENDING PROVIDER:  Attending Name and NPI#: Emir David Do [[id number]]  Address: [address] [place] [address]  Phone: [phone]   ADMISSION INFORMATION:  Place of Service: Inpatient [facility]  Place of Service Code: 21  Inpatient Admission Date/Time: 9/13/24 12:05 PM  Discharge Date/Time: No discharge date for patient encounter.  Admitting Diagnosis Code/Description:  Chest pain [R07.9]  IV drug user [F19.90]     UTILIZATION REVIEW CONTACT:  Antolin Tapia Utilization   Network Utilization Review Department  Phone: [phone]  Fax: [phone]  Email: [email or web address]  Contact for approvals/pending authorizations, clinical reviews, and discharge.     PHYSICIAN ADVISORY SERVICES:  Medical Necessity Denial & [id number]-[id number] Review  Phone: [phone]  Fax: [phone]  Email: [email or web address]     DISCHARGE SUPPORT TEAM:  For Patients Discharge Needs & Updates  Phone: [phone] opt. 2 Fax: [phone]  Email: [email or web address]

## 2024-09-15 NOTE — ED ATTENDING ATTESTATION
9/11/2024  I, Betty Bautista MD, saw and evaluated the patient. I have discussed the patient with the resident/non-physician practitioner and agree with the resident's/non-physician practitioner's findings, Plan of Care, and MDM as documented in the resident's/non-physician practitioner's note, except where noted. All available labs and Radiology studies were reviewed.  I was present for key portions of any procedure(s) performed by the resident/non-physician practitioner and I was immediately available to provide assistance.       At this point I agree with the current assessment done in the Emergency Department.  I have conducted an independent evaluation of this patient a history and physical is as follows:    44-year-old male with history of opiate use disorder presenting for evaluation of elevated blood pressure.  Patient states he is supposed to be taking lisinopril but inconsistently takes his medications.  He states he is notices blood pressure has been elevated.  He denies any chest pain, shortness of breath, abdominal pain, nausea, vomiting, or diarrhea.  Denies any blurred or double vision, numbness or weakness in his extremities, ataxia, dysphagia, or dysarthria.  He states he did use IV fentanyl yesterday.  He did not require Narcan.     Physical exam:  Vital signs reviewed, slightly hypertensive, vitals otherwise normal.  Patient is awake and alert, no acute distress, head normocephalic, atraumatic, mucous membranes moist, neck supple, heart regular rate and rhythm, no murmurs/rubs/gallops, lungs clear to auscultation bilaterally, abdomen soft, non tender, non distended, no rebound or guarding, no peripheral edema, no skin rashes, no focal neurologic deficits.     Assessment/plan:  44-year-old male with history of opiate use disorder presenting for evaluation of elevated blood pressure.   Patient does have elevated blood pressure but otherwise not having any symptoms at this time.  Will check EKG.   Advised patient to continue his lisinopril as prescribed, he should be taking it every day.  He should follow-up with his primary care doctor.  He otherwise has no signs of endorgan dysfunction.  Discussed with patient and his wife strict return precautions.  They expressed understanding and were agreeable for discharge.    ED Course         Critical Care Time  Procedures

## 2024-09-15 NOTE — ASSESSMENT & PLAN NOTE
Patient presenting with subjective fevers chills nausea chest discomfort diaphoresis, admits to ongoing IV drug abuse last use yesterday  Patient reports he was on long-term antibiotics for for infection in his spine  My primary suspicion is for a bacteremia, will cover with vancomycin , cefepime added for gram negative coverage  Check echocardiogram -reviewed without any vegetations  Check CT chest due to ongoing chest discomfort to sternal area also MRI of cervical spine-imaging is unremarkable    9/15/24: Patient's blood cultures are negative at 24 hours, would expect growth if he had a true bacteremia at this point but will monitor until tomorrow for clearance at 48 hours.  Suspicion that he may have gotten a bad batch of heroin, he reports his friend had similar symptoms after using the same substance  Will monitor off antibiotics  Will consult toxicology for initiation of MAT

## 2024-09-15 NOTE — PROGRESS NOTES
"Progress Note - Infectious Disease   Bryant Lazcano 44 y.o. male MRN: [id number]  Unit/Bed#: [place] 412-01 Encounter: [id number]      Impression/Recommendations:  SIRS; leukocytosis, tachycardia  -Suspect may have been 2/2 \"bad batch\" of heroin.  No obvious source of infection. ROS and exam currently benign.  COVID/Flu/RSV PCR, UA, blood cultures, CXR, CT chest, MRI c-spine negative.   -Stop antibiotics  -Follow up final blood cultures  -Monitor for fever  -Repeat CBC + diff tomorrow to monitor leukocytosis     Chest pain and Left clavicular pain:  -Suspect musculoskeletal in setting of severe rigors. CXR, CT chest unremarkable.  Symptoms resolved  -Serial exams     Cervical neck pain  -Suspect musculoskeletal in setting of severe rigors.  MRI C-spine unremarkable.  Symptoms resolved.  -Serial exams     MARICARMEN  -Interested in recover, MAT   -Consider toxicology consult for MAT initiation with ultimate goal of patient to transition back to vivitrol     Acute transaminitis.  Consider due to IVDU in setting of possible underlying chronic liver disease.  Improved.    History of untreated Hep C:  Unclear disease activity  -Check hepatitis C viral load, HIV  -Trend LFTs     Remote history of MRSA infection/spinal infection:  -Per patient. No records available to confirm or prior cultures.    I discussed with Dr. David the above plan to stop antibiotics and consider initiation of MAT.  He agrees with the plan.    Antibiotics:  Vanco  Cefepime    Subjective/24 Hour Events:  Patient seen on AM rounds.  Feels great, back to normal.  When questioned, was having chills, shakes for several days at home ever since starting a new batch of heroin.  Suspects it may have been fentanyl as was clear as opposed to brown when he attempted to reconstituted.  States a friend of his also used the same batch and had similar symptoms at home.  Currently not having any withdrawal symptoms.  Is interested in recovery.  States Vivitrol worked for " him in the past and would be interested in pursuing this although would except Suboxone initiation in the meantime.    Objective:  Vitals:  Temp:  [97.6 °F (36.4 °C)-99.6 °F (37.6 °C)] 97.6 °F (36.4 °C)  HR:  [] 118  Resp:  [18-20] 18  BP: (131-199)/() 166/102  SpO2:  [95 %-97 %] 97 %  Temp (24hrs), Av.6 °F (37 °C), Min:97.6 °F (36.4 °C), Max:99.6 °F (37.6 °C)  Current: Temperature: 97.6 °F (36.4 °C)    Physical Exam:   General:  No acute distress  Psychiatric:  Awake and alert  Chest:  No palpable abnormality or tenderness  Pulmonary:  Normal respiratory excursion without accessory muscle use  Abdomen:  Soft, nontender  Extremities:  No edema  Skin:  No rashes    Lab Results:  I have personally reviewed pertinent labs.  Results from last 7 days   Lab Units 09/15/24  0919 24  1053 24  0616   SODIUM mmol/L 139 139 136   POTASSIUM mmol/L 3.4* 3.1* 3.6   CHLORIDE mmol/L 106 102 102   CO2 mmol/L 25 27 19*   BUN mg/dL 12 12 8   CREATININE mg/dL 0.58* 0.66 0.64   EGFR ml/min/1.73sq m 123 116 118   CALCIUM mg/dL 9.4 9.6 9.6   AST U/L  --  30 51*   ALT U/L  --  42 58*   ALK PHOS U/L  --  79 94     Results from last 7 days   Lab Units 09/15/24  0908 24  1053 24  0647   WBC Thousand/uL 14.29* 17.96* 15.04*   HEMOGLOBIN g/dL 14.4 15.2 16.2   PLATELETS Thousands/uL 175 201 209     Results from last 7 days   Lab Units 24  0928 24  0640 24  0631   BLOOD CULTURE  No Growth at 24 hrs. No Growth at 24 hrs. No Growth at 24 hrs.       Imaging Studies:   I have personally reviewed pertinent imaging study reports and images in PACS.  CT chest images reviewed no ST of bony abnormality around left clavicular area    EKG, Pathology, and Other Studies:   I have personally reviewed pertinent reports.

## 2024-09-15 NOTE — CASE MANAGEMENT
Case Management Assessment & Discharge Planning Note    Patient name Bryant Lazcano  Location [facility] 412/[facility] 412-01 MRN [id number]  : 1980 Date 9/15/2024       Current Admission Date: 2024  Current Admission Diagnosis:IV drug user   Patient Active Problem List    Diagnosis Date Noted Date Diagnosed    Type 2 diabetes mellitus without complication, without long-term current use of insulin (HCC) 2023     Chronic hepatitis C without hepatic coma (HCC) 2022     IV drug user 2022       LOS (days): 2  Geometric Mean LOS (GMLOS) (days): 1.7  Days to GMLOS:-0.4     OBJECTIVE:    Risk of Unplanned Readmission Score: 10.51         Current admission status: Inpatient  Referral Reason: Drug/Alcohol Abuse    Preferred Pharmacy:   [id number] - [address]  [address] [place] [id number]  Phone: [phone] Fax: [phone]    [id number] [place] [address]  [address] [facility] [address]  Phone: [phone] Fax: [phone]    Primary Care Provider: No primary care provider on file.    Primary Insurance: [email or web address]  Secondary Insurance:     ASSESSMENT:  Active Health Care Proxies    There are no active Health Care Proxies on file.          Readmission Root Cause  30 Day Readmission: No    Patient Information  Admitted from:: Home  Mental Status: Alert  During Assessment patient was accompanied by: Spouse  Assessment information provided by:: Patient  Primary Caregiver: Self  Support Systems: Self, Spouse/significant other  County of Residence: [place]  What city do you live in?: [place]  Home entry access options. Select all that apply.: Stairs  Number of steps to enter home.: 3  Type of Current Residence: [address] [place] home  Upon entering residence, is there a bedroom on the main floor (no further steps)?: No  A bedroom is located on the following floor levels of residence (select all that  apply):: 2nd Floor  Upon entering residence, is there a bathroom on the main floor (no further steps)?: Yes  Number of steps to 2nd floor from main floor: One Flight  Living Arrangements: Lives w/ Spouse/significant other    Activities of Daily Living Prior to Admission  Functional Status: Independent  Completes ADLs independently?: Yes  Ambulates independently?: Yes  Does patient use assisted devices?: No  Does patient currently own DME?: No  Does patient have a history of Outpatient Therapy (PT/OT)?: No  Does the patient have a history of Short-Term Rehab?: No  Does patient have a history of HHC?: No  Does patient currently have HHC?: No         Patient Information Continued  Income Source: Employed  Does patient have prescription coverage?: Yes  Does patient receive dialysis treatments?: No  Does patient have a history of substance abuse?: Currently using  Current substance use preference: Heroin  Is patient currently in treatment for substance abuse?: No. Treatment options provided (HOST referral made)  Does patient have a history of Mental Health Diagnosis?: Yes (Bipolar)  Is patient receiving treatment for mental health?: No. Patient declined treatment information.         Means of Transportation  Means of Transport to Appts:: Drives Self      Social Determinants of Health (SDOH)      Flowsheet Row Most Recent Value   Housing Stability    In the last 12 months, was there a time when you were not able to pay the mortgage or rent on time? N   In the past 12 months, how many times have you moved where you were living? 0   At any time in the past 12 months, were you homeless or living in a shelter (including now)? N   Transportation Needs    In the past 12 months, has lack of transportation kept you from medical appointments or from getting medications? no   In the past 12 months, has lack of transportation kept you from meetings, work, or from getting things needed for daily living? No   Food Insecurity    Within  the past 12 months, you worried that your food would run out before you got the money to buy more. Never true   Within the past 12 months, the food you bought just didn't last and you didn't have money to get more. Never true   Utilities    In the past 12 months has the electric, gas, oil, or water company threatened to shut off services in your home? No            DISCHARGE DETAILS:    Discharge planning discussed with:: Patient and wife  Freedom of Choice: Yes     CM contacted family/caregiver?: Yes  Were Treatment Team discharge recommendations reviewed with patient/caregiver?: Yes  Did patient/caregiver verbalize understanding of patient care needs?: Yes  Were patient/caregiver advised of the risks associated with not following Treatment Team discharge recommendations?: Yes    Contacts  Patient Contacts: XAVI BLACKMAN (Spouse)  [phone]  Relationship to Patient:: Family  Contact Method: In Person  Reason/Outcome: Discharge Planning, Emergency Contact, Referral       Other Referral/Resources/Interventions Provided:  Interventions: Other (Specify) (HOST)  Referral Comments: CM completed a HOST referral with pt's permission.     Additional Comments: CM met with pt and wife at bedside.  Pt is independent and works full time as a .  Pt states he has been using IV heroin but is currently 3 days sober since being in the hospital. Pt is agreeable to HOST referral.  Pt signed release of information.  CM sent referral to HOST.  Pt states he is not interested in IP rehab but is interested in OP programs.

## 2024-09-15 NOTE — CONSULTS
INTERPROFESSIONAL (PHONE) CONSULTATION - Medical Toxicology  Bryant Lazcano 44 y.o. male MRN: [id number]  Unit/Bed#: [facility] 412-01 Encounter: [id number]      Reason for Consult / Principal Problem: Opioid use disorder  Inpatient consult to Toxicology  Consult performed by: Seb Lockhart DO  Consult ordered by: Emir David DO        09/15/24      ASSESSMENT:    Opioid use disorder  Opioid withdrawal syndrome  Tobacco use disorder  Leukocytosis    RECOMMENDATIONS:    Patient has approximately 48 hours after last use of opioids.  He is interested in obtaining MAT.  Would recommend micro induction with Suboxone.  Recommend 2 mg dose now.  If he responds well, would continue 2 mg every 2 hours for total of 4 doses (total of 8 mg).  If he responds poorly at any point, would hold any further dosings of Suboxone and treat supportively with adjuncts which are listed below    If he responds well to the initial doses, would proceed with implementing maintenance dosing tomorrow which is 8 mg twice a day.  Please also involve case management for assistance with arranging outpatient follow-up and management of opioid use disorder.  Upon discharge please ensure patient has a sufficient supply/prescription of Suboxone to bridge him to his follow-up appointment.  .  For further questions, please contact the medical  on call via [place] Text between 8am and 9pm. If between 9pm and 8am, please reach out to the [facility]ison [facility] at [phone].     Please see additional teaching note below:    Medical Toxicology  [facility]  Discussion: Opiods/Opiates     Introduction: Opiates are naturally occurring compounds derived from the juice of the poppy Papaver somniferum while opiods are these as well as synthetically derived opiate analogs.  They are used predominately as pain medications and are frequently abused.    Metabolism/Pharmacokinetics: Opiods stimulate opiate receptors in the CNS  causing sedation and respiratory depression.  Death typically results from respiratory failure secondary to apnea or aspiration.  Peak effect typically occurs in 2-3 hours after ingestion by may be delayed secondary to decreased gastric motility or extended release preparations.  Rates of elimination vary widely from 1 to 30 hours depending on the formulation.    Toxic Dose:  Varies widely based on the medication and the patient’s tolerance to the drug.    Clinical Presentation:      Mild-Moderate Overdose:  Lethargy, “pinpoint” pupils, decreased blood pressure, decreased heart rate, decreased bowel sounds, muscle laxity.    Severe Overdose:  Coma, respiratory depression, apnea, noncardiogenic pulmonary edema, death.     Specific Agents:  Codeine, dextromethorphan, meperiding, propoxyphene, and tramadol may cause seizures.  Propoxyphene may produce cardiotoxicity.    Withdrawal symptoms:  While the withdrawal symptoms make may the patient wish they were dead, they are not fatal.  Symptoms include anxiety, piloerection, abdominal cramps, diarrhea, and insomnia.      Diagnosis:  The diagnosis of opiod intoxication/withdrawal is typically clinical.  While a urine drug screen can detect morphine it is unable to detect synthetic opiods and the utility of the UDS in adult patients is limited.    Treatment:    The key intervention necessary to avoid morbidity/mortality in the opiod overdose patient is appropriate airway/respiratory management.    Naloxone (Narcan) can be used to reverse an overdose but should be used cautiously in opiod dependent patient (0.2 to 0.4 mg IV, repeated to desired effect) given the potential for withdrawal symptoms or unmasking of a co-ingestant such as a sympathomimetic.  Naloxone has a half-fife of only 1-2 hours (less than many narcotics) so the patient must be monitored closely for sedation/apnea for 4 hours after the last dose of narcan.    Decontamination:  Activated charcoal may be used  "in acute ingestions but the risk of aspiration limits its use in the non-intubated patient.        References    Opiates and Opiods.  DEANNE Alexander.  In Castellon KR, ed. Poisoning & Drug Overdose [facility]: [facility], 2004: pp. 288-92        Adjunctive medications administered as needed:  Clonidine 0.1 mg PO Q6 hours PRN anxiety or palpitations    Gabapentin 300mg PO Q8 hours PRN anxiety    Diazepam 5 mg p.o. or IV q8 PRN refractory anxiety  Ibuprofen 600 mg PO Q6 hours PRN pain    Acetaminophen 1000mg PO Q8 hours PRN pain    Ondansetron 4 mg PO Q6 hours PRN N/V    Reglan 5-10 mg IV q8 p.r.n. refractory nausea vomiting  Nicotine patch 7, 14, 21 mg  PRN nicotine withdrawal   Trazodone 50 mg PO QHS PRN sleep    Loperamide 4 mg PO PRN diarrhea up to 16 mg/day           Hx and PE limited by the dynamics of a phone consultation. I have not personally interviewed or evaluated the patient, but only advised based on the information provided to me. Primary provider is responsible for all clinical decisions.     Pertinent history, physical exam and clinical findings and course discussed: Bryant Lazcano is a 44 y.o. year old male who presents with with fevers, chills, nausea, diaphoresis in the setting of ongoing IV drug use.  Report that he has been chronically using heroin.  Last use was the morning of the day of admission on 9/13.  He has been receiving clonidine and Valium for his withdrawal symptoms.  He has been experiencing anxiety, sweats, headache, elevated blood pressure.  No reported GI symptoms.  Patient is interested in starting Suboxone.    Review of systems and physical exam not performed by me.    Historical Information   Past Medical History:   Diagnosis Date    Asthma     Hepatitis C      Past Surgical History:   Procedure Laterality Date    BACK SURGERY  2010    LUMBAR PUNCTURE       Social History   Social History     Substance and Sexual Activity   Alcohol Use Yes    Comment: \"occasionally\"     Social " History     Substance and Sexual Activity   Drug Use Yes    Types: Heroin, Marijuana, Oxycodone    Comment: hx of heroin     Social History     Tobacco Use   Smoking Status Every Day    Current packs/day: 0.50    Types: Cigarettes   Smokeless Tobacco Never     History reviewed. No pertinent family history.     Prior to Admission medications    Medication Sig Start Date End Date Taking? Authorizing Provider   acetaminophen (TYLENOL) 650 mg CR tablet Take 1 tablet (650 mg total) by mouth every 8 (eight) hours as needed for mild pain  Patient not taking: Reported on 4/17/2022 9/18/18   Josue Wu PA-C   albuterol (PROVENTIL HFA,VENTOLIN HFA) 90 mcg/act inhaler Inhale 2 puffs every 6 (six) hours as needed for wheezing  Patient not taking: Reported on 4/17/2022 1/18/18   Saskia Parker DO   ibuprofen (MOTRIN) 800 mg tablet Take 1 tablet (800 mg total) by mouth every 8 (eight) hours as needed for mild pain 5/12/22   Estefanía Rosado PA-C   lisinopril (ZESTRIL) 20 mg tablet Take 20 mg by mouth in the morning. 4/21/22   Historical Provider, MD         Current Facility-Administered Medications:     acetaminophen (TYLENOL) tablet 975 mg, Q8H MITZI    cloNIDine (CATAPRES) tablet 0.2 mg, Q8H MITZI    cyclobenzaprine (FLEXERIL) tablet 10 mg, TID    diazepam (VALIUM) injection 10 mg, Q6H PRN    doxylamine (UNISOM) tablet 25 mg, HS PRN    enoxaparin (LOVENOX) subcutaneous injection 40 mg, Daily    gabapentin (NEURONTIN) capsule 300 mg, TID    hydrALAZINE (APRESOLINE) tablet 25 mg, Q12H    ketorolac (TORADOL) injection 15 mg, Q6H PRN    labetalol (NORMODYNE) injection 10 mg, Q6H PRN    lisinopril (ZESTRIL) tablet 20 mg, Daily    nicotine (NICODERM CQ) 14 mg/24hr TD 24 hr patch 1 patch, Daily    ondansetron (ZOFRAN) injection 4 mg, Q6H PRN    pantoprazole (PROTONIX) injection 40 mg, Q24H MITZI    traZODone (DESYREL) tablet 50 mg, HS    Allergies   Allergen Reactions    Aspirin Hives       Objective       Intake/Output Summary  "(Last 24 hours) at 9/15/2024 1420  Last data filed at 9/15/2024 0919  Gross per 24 hour   Intake 639 ml   Output 0 ml   Net 639 ml       Invasive Devices:   Peripheral IV 09/13/24 Left Antecubital (Active)   Site Assessment WDL;Clean;Dry;Intact 09/14/24 2200   Dressing Type Transparent 09/14/24 2200   Line Status Infusing 09/14/24 2200   Dressing Status Clean;Dry;Intact 09/14/24 2200   Dressing Change Due 09/17/24 09/14/24 2200       Vitals   Vitals:    09/15/24 0010 09/15/24 0237 09/15/24 0603 09/15/24 0819   BP: (!) 180/100 169/83 (!) 178/99 (!) 166/102   TempSrc:    Oral   Pulse:  96  (!) 118   Resp:    18   Patient Position - Orthostatic VS:    Lying   Temp:    97.6 °F (36.4 °C)         EKG, Pathology, and/or Other Studies: No pertinent imaging studies reviewed.      Lab Results:   I personally reviewed lab results    Labs:    Results from last 7 days   Lab Units 09/15/24  0908 09/14/24  1053 09/13/24  0647   WBC Thousand/uL 14.29* 17.96* 15.04*   HEMOGLOBIN g/dL 14.4 15.2 16.2   HEMATOCRIT % 41.2 41.6 44.9   PLATELETS Thousands/uL 175 201 209   SEGS PCT % 65 78* 77*   LYMPHO PCT % 26 15 16   MONO PCT % 7 7 5   EOS PCT % 1 0 0      Results from last 7 days   Lab Units 09/15/24  0919 09/14/24  1053   SODIUM mmol/L 139 139   POTASSIUM mmol/L 3.4* 3.1*   CHLORIDE mmol/L 106 102   CO2 mmol/L 25 27   BUN mg/dL 12 12   CREATININE mg/dL 0.58* 0.66   CALCIUM mg/dL 9.4 9.6   ALK PHOS U/L  --  79   ALT U/L  --  42   AST U/L  --  30      Results from last 7 days   Lab Units 09/13/24  0616   INR  1.01   PTT seconds 24     Results from last 7 days   Lab Units 09/13/24  0640   LACTIC ACID mmol/L 1.8     No results found for: \"TROPONINI\"          Invalid input(s): \"EXTPREGUR\"      Imaging Studies: No pertinent imaging studies reviewed.    Counseling / Coordination of Care  Total time spent today 20 minutes. This was a phone consultation.       "

## 2024-09-15 NOTE — PROGRESS NOTES
Vancomycin IV Pharmacy-to-Dose Consultation    Bryant Lazcano is a 44 y.o. male who was receiving Vancomycin IV with management by the Pharmacy Consult service for treatment of Bacteremia (goal -600, trough >10)  .       The patient’s Vancomycin therapy has been completed / discontinued. Thank you for allowing us to take part in this patient's care. Pharmacy will sign-off now; please call or re-consult if there are any questions.        Bryant Parra, PharmD

## 2024-09-15 NOTE — PROGRESS NOTES
Progress Note - Hospitalist   Name: Bryant Lazcano 44 y.o. male I MRN: [id number]  Unit/Bed#: [facility] 412-01 I Date of Admission: 9/13/2024   Date of Service: 9/15/2024 I Hospital Day: 2    Assessment & Plan  Chronic hepatitis C without hepatic coma (HCC)  Check updated labs-pending  IV drug user  Patient presenting with subjective fevers chills nausea chest discomfort diaphoresis, admits to ongoing IV drug abuse last use yesterday  Patient reports he was on long-term antibiotics for for infection in his spine  My primary suspicion is for a bacteremia, will cover with vancomycin , cefepime added for gram negative coverage  Check echocardiogram -reviewed without any vegetations  Check CT chest due to ongoing chest discomfort to sternal area also MRI of cervical spine-imaging is unremarkable    9/15/24: Patient's blood cultures are negative at 24 hours, would expect growth if he had a true bacteremia at this point but will monitor until tomorrow for clearance at 48 hours.  Suspicion that he may have gotten a bad batch of heroin, he reports his friend had similar symptoms after using the same substance  Will monitor off antibiotics  Will consult toxicology for initiation of MAT      Type 2 diabetes mellitus without complication, without long-term current use of insulin (HCC)  Lab Results   Component Value Date    HGBA1C 5.2 04/11/2024       Recent Labs     09/13/24 2043 09/14/24  0751   POCGLU 137 148*       Blood Sugar Average: Last 72 hrs:  (P) 142.5      VTE Pharmacologic Prophylaxis: VTE Score: 0 Moderate Risk (Score 3-4) - Pharmacological DVT Prophylaxis Ordered: enoxaparin (Lovenox).    Mobility:   Basic Mobility Inpatient Raw Score: 20  JH-HLM Goal: 6: Walk 10 steps or more  JH-HLM Achieved: 6: Walk 10 steps or more  JH-HLM Goal achieved. Continue to encourage appropriate mobility.    Patient Centered Rounds: I performed bedside rounds with nursing staff today.   Discussions with Specialists or Other Care Team  Provider: ID    Education and Discussions with Family / Patient: Updated  (significant other) at bedside.    Current Length of Stay: 2 day(s)  Current Patient Status: Inpatient   Certification Statement: The patient will continue to require additional inpatient hospital stay due to blood culture clearance   Discharge Plan: Anticipate discharge in 24-48 hrs to home with home services.    Code Status: Level 1 - Full Code    Subjective   Patient continues to feel significant better from admission no fevers or chills, chest discomfort and neck pain improving, interested in quitting drug use    Objective     Vitals:   Temp (24hrs), Av.6 °F (37 °C), Min:97.6 °F (36.4 °C), Max:99.6 °F (37.6 °C)    Temp:  [97.6 °F (36.4 °C)-99.6 °F (37.6 °C)] 97.6 °F (36.4 °C)  HR:  [] 118  Resp:  [18-20] 18  BP: (131-199)/() 166/102  SpO2:  [97 %] 97 %  Body mass index is 31.57 kg/m².     Input and Output Summary (last 24 hours):     Intake/Output Summary (Last 24 hours) at 9/15/2024 1157  Last data filed at 9/15/2024 0919  Gross per 24 hour   Intake 639 ml   Output 0 ml   Net 639 ml       Physical Exam  Constitutional:       General: He is not in acute distress.     Appearance: He is not toxic-appearing or diaphoretic.   Eyes:      General: No scleral icterus.  Cardiovascular:      Rate and Rhythm: Regular rhythm. Tachycardia present.      Heart sounds: No murmur heard.     No friction rub. No gallop.   Pulmonary:      Effort: No respiratory distress.      Breath sounds: No stridor. No wheezing, rhonchi or rales.   Chest:      Chest wall: No tenderness.   Abdominal:      General: There is no distension.      Palpations: There is no mass.      Tenderness: There is no abdominal tenderness. There is no guarding or rebound.      Hernia: No hernia is present.   Musculoskeletal:         General: Tenderness present. No swelling.      Right lower leg: No edema.      Left lower leg: No edema.   Neurological:       Mental Status: He is oriented to person, place, and time.          Lines/Drains:  Lines/Drains/Airways       Active Status       None                      Telemetry:  Telemetry Orders (From admission, onward)               24 Hour Telemetry Monitoring  Continuous x 24 Hours (Telem)        Question:  Reason for 24 Hour Telemetry  Answer:  Patients with zeenat/arabella/endocarditis; cardiac contusion                                Lab Results: I have reviewed the following results: CBC/BMP:   .     09/15/24  0908 09/15/24  0919   WBC 14.29*  --    HGB 14.4  --    HCT 41.2  --      --    SODIUM  --  139   K  --  3.4*   CL  --  106   CO2  --  25   BUN  --  12   CREATININE  --  0.58*   GLUC  --  165*       Results from last 7 days   Lab Units 09/15/24  0908   WBC Thousand/uL 14.29*   HEMOGLOBIN g/dL 14.4   HEMATOCRIT % 41.2   PLATELETS Thousands/uL 175   SEGS PCT % 65   LYMPHO PCT % 26   MONO PCT % 7   EOS PCT % 1     Results from last 7 days   Lab Units 09/15/24  0919 09/14/24  1053   SODIUM mmol/L 139 139   POTASSIUM mmol/L 3.4* 3.1*   CHLORIDE mmol/L 106 102   CO2 mmol/L 25 27   BUN mg/dL 12 12   CREATININE mg/dL 0.58* 0.66   ANION GAP mmol/L 8 10   CALCIUM mg/dL 9.4 9.6   ALBUMIN g/dL  --  4.5   TOTAL BILIRUBIN mg/dL  --  1.82*   ALK PHOS U/L  --  79   ALT U/L  --  42   AST U/L  --  30   GLUCOSE RANDOM mg/dL 165* 178*     Results from last 7 days   Lab Units 09/13/24  0616   INR  1.01     Results from last 7 days   Lab Units 09/14/24  0751 09/13/24  2043   POC GLUCOSE mg/dl 148* 137         Results from last 7 days   Lab Units 09/13/24  0640 09/13/24  0616   LACTIC ACID mmol/L 1.8  --    PROCALCITONIN ng/ml  --  0.07       Recent Cultures (last 7 days):   Results from last 7 days   Lab Units 09/13/24  0928 09/13/24  0640 09/13/24  0631   BLOOD CULTURE  No Growth at 24 hrs. No Growth at 24 hrs. No Growth at 24 hrs.       Imaging Review: No pertinent imaging studies reviewed.  Other Studies: No additional pertinent  studies reviewed.    Last 24 Hours Medication List:     Current Facility-Administered Medications:     acetaminophen (TYLENOL) tablet 975 mg, Q8H MITZI    cloNIDine (CATAPRES) tablet 0.2 mg, Q8H MITZI    cyclobenzaprine (FLEXERIL) tablet 10 mg, TID    diazepam (VALIUM) injection 10 mg, Q6H PRN    doxylamine (UNISOM) tablet 25 mg, HS PRN    enoxaparin (LOVENOX) subcutaneous injection 40 mg, Daily    gabapentin (NEURONTIN) capsule 300 mg, TID    hydrALAZINE (APRESOLINE) tablet 25 mg, Q12H    ketorolac (TORADOL) injection 15 mg, Q6H PRN    labetalol (NORMODYNE) injection 10 mg, Q6H PRN    lisinopril (ZESTRIL) tablet 20 mg, Daily    nicotine (NICODERM CQ) 14 mg/24hr TD 24 hr patch 1 patch, Daily    ondansetron (ZOFRAN) injection 4 mg, Q6H PRN    pantoprazole (PROTONIX) injection 40 mg, Q24H MITZI    traZODone (DESYREL) tablet 50 mg, HS    Administrative Statements   Today, Patient Was Seen By: Emir David DO      **Please Note: This note may have been constructed using a voice recognition system.**

## 2024-09-16 LAB
ALBUMIN SERPL BCG-MCNC: 4.1 G/DL (ref 3.5–5)
ALP SERPL-CCNC: 84 U/L (ref 34–104)
ALT SERPL W P-5'-P-CCNC: 61 U/L (ref 7–52)
ANION GAP SERPL CALCULATED.3IONS-SCNC: 9 MMOL/L (ref 4–13)
AST SERPL W P-5'-P-CCNC: 53 U/L (ref 13–39)
BASOPHILS # BLD AUTO: 0.1 THOUSANDS/ΜL (ref 0–0.1)
BASOPHILS NFR BLD AUTO: 1 % (ref 0–1)
BILIRUB SERPL-MCNC: 0.95 MG/DL (ref 0.2–1)
BUN SERPL-MCNC: 15 MG/DL (ref 5–25)
CALCIUM SERPL-MCNC: 9.4 MG/DL (ref 8.4–10.2)
CHLORIDE SERPL-SCNC: 107 MMOL/L (ref 96–108)
CO2 SERPL-SCNC: 28 MMOL/L (ref 21–32)
CREAT SERPL-MCNC: 0.73 MG/DL (ref 0.6–1.3)
EOSINOPHIL # BLD AUTO: 0.45 THOUSAND/ΜL (ref 0–0.61)
EOSINOPHIL NFR BLD AUTO: 4 % (ref 0–6)
ERYTHROCYTE [DISTWIDTH] IN BLOOD BY AUTOMATED COUNT: 12.1 % (ref 11.6–15.1)
GFR SERPL CREATININE-BSD FRML MDRD: 112 ML/MIN/1.73SQ M
GLUCOSE SERPL-MCNC: 135 MG/DL (ref 65–140)
HCT VFR BLD AUTO: 42.2 % (ref 36.5–49.3)
HGB BLD-MCNC: 14.4 G/DL (ref 12–17)
HIV 1+2 AB+HIV1 P24 AG SERPL QL IA: NORMAL
HIV1 P24 AG SER QL: NORMAL
IMM GRANULOCYTES # BLD AUTO: 0.05 THOUSAND/UL (ref 0–0.2)
IMM GRANULOCYTES NFR BLD AUTO: 1 % (ref 0–2)
LYMPHOCYTES # BLD AUTO: 3.36 THOUSANDS/ΜL (ref 0.6–4.47)
LYMPHOCYTES NFR BLD AUTO: 32 % (ref 14–44)
MCH RBC QN AUTO: 30.4 PG (ref 26.8–34.3)
MCHC RBC AUTO-ENTMCNC: 34.1 G/DL (ref 31.4–37.4)
MCV RBC AUTO: 89 FL (ref 82–98)
MONOCYTES # BLD AUTO: 0.71 THOUSAND/ΜL (ref 0.17–1.22)
MONOCYTES NFR BLD AUTO: 7 % (ref 4–12)
NEUTROPHILS # BLD AUTO: 5.76 THOUSANDS/ΜL (ref 1.85–7.62)
NEUTS SEG NFR BLD AUTO: 55 % (ref 43–75)
NRBC BLD AUTO-RTO: 0 /100 WBCS
PLATELET # BLD AUTO: 152 THOUSANDS/UL (ref 149–390)
PMV BLD AUTO: 10.9 FL (ref 8.9–12.7)
POTASSIUM SERPL-SCNC: 3.3 MMOL/L (ref 3.5–5.3)
PROT SERPL-MCNC: 7.4 G/DL (ref 6.4–8.4)
RBC # BLD AUTO: 4.74 MILLION/UL (ref 3.88–5.62)
SODIUM SERPL-SCNC: 144 MMOL/L (ref 135–147)
WBC # BLD AUTO: 10.43 THOUSAND/UL (ref 4.31–10.16)

## 2024-09-16 PROCEDURE — 87521 HEPATITIS C PROBE&RVRS TRNSC: CPT | Performed by: INTERNAL MEDICINE

## 2024-09-16 PROCEDURE — 99232 SBSQ HOSP IP/OBS MODERATE 35: CPT | Performed by: INTERNAL MEDICINE

## 2024-09-16 PROCEDURE — 87806 HIV AG W/HIV1&2 ANTB W/OPTIC: CPT | Performed by: INTERNAL MEDICINE

## 2024-09-16 PROCEDURE — 80053 COMPREHEN METABOLIC PANEL: CPT | Performed by: INTERNAL MEDICINE

## 2024-09-16 PROCEDURE — 87522 HEPATITIS C REVRS TRNSCRPJ: CPT | Performed by: INTERNAL MEDICINE

## 2024-09-16 PROCEDURE — 85025 COMPLETE CBC W/AUTO DIFF WBC: CPT | Performed by: INTERNAL MEDICINE

## 2024-09-16 RX ORDER — BUPRENORPHINE AND NALOXONE 8; 2 MG/1; MG/1
8 FILM, SOLUBLE BUCCAL; SUBLINGUAL DAILY
Status: DISCONTINUED | OUTPATIENT
Start: 2024-09-17 | End: 2024-09-17 | Stop reason: HOSPADM

## 2024-09-16 RX ORDER — BUPRENORPHINE AND NALOXONE 2; .5 MG/1; MG/1
4 FILM, SOLUBLE BUCCAL; SUBLINGUAL ONCE
Status: COMPLETED | OUTPATIENT
Start: 2024-09-16 | End: 2024-09-16

## 2024-09-16 RX ORDER — TRAZODONE HYDROCHLORIDE 50 MG/1
50 TABLET, FILM COATED ORAL
Qty: 30 TABLET | Refills: 0 | Status: SHIPPED | OUTPATIENT
Start: 2024-09-16 | End: 2024-10-16

## 2024-09-16 RX ORDER — GABAPENTIN 300 MG/1
300 CAPSULE ORAL 3 TIMES DAILY
Qty: 90 CAPSULE | Refills: 0 | Status: SHIPPED | OUTPATIENT
Start: 2024-09-16 | End: 2024-10-16

## 2024-09-16 RX ORDER — CLONIDINE HYDROCHLORIDE 0.2 MG/1
0.2 TABLET ORAL EVERY 8 HOURS SCHEDULED
Qty: 60 TABLET | Refills: 0 | Status: SHIPPED | OUTPATIENT
Start: 2024-09-16

## 2024-09-16 RX ORDER — DIAZEPAM 10 MG
10 TABLET ORAL EVERY 6 HOURS PRN
Qty: 30 TABLET | Refills: 0 | Status: SHIPPED | OUTPATIENT
Start: 2024-09-16 | End: 2024-09-26

## 2024-09-16 RX ORDER — POTASSIUM CHLORIDE 1500 MG/1
40 TABLET, EXTENDED RELEASE ORAL ONCE
Status: COMPLETED | OUTPATIENT
Start: 2024-09-16 | End: 2024-09-16

## 2024-09-16 RX ORDER — BUPRENORPHINE AND NALOXONE 8; 2 MG/1; MG/1
8 FILM, SOLUBLE BUCCAL; SUBLINGUAL DAILY
Qty: 30 FILM | Refills: 0 | Status: SHIPPED | OUTPATIENT
Start: 2024-09-17 | End: 2024-10-17

## 2024-09-16 RX ADMIN — DIAZEPAM 10 MG: 10 INJECTION, SOLUTION INTRAMUSCULAR; INTRAVENOUS at 16:24

## 2024-09-16 RX ADMIN — NICOTINE 1 PATCH: 14 PATCH, EXTENDED RELEASE TRANSDERMAL at 08:19

## 2024-09-16 RX ADMIN — DIAZEPAM 10 MG: 10 INJECTION, SOLUTION INTRAMUSCULAR; INTRAVENOUS at 22:41

## 2024-09-16 RX ADMIN — HYDRALAZINE HYDROCHLORIDE 25 MG: 25 TABLET ORAL at 11:44

## 2024-09-16 RX ADMIN — BUPRENORPHINE AND NALOXONE 4 MG: 2; .5 FILM BUCCAL; SUBLINGUAL at 16:18

## 2024-09-16 RX ADMIN — HYDRALAZINE HYDROCHLORIDE 25 MG: 25 TABLET ORAL at 00:01

## 2024-09-16 RX ADMIN — ACETAMINOPHEN 975 MG: 325 TABLET, FILM COATED ORAL at 21:53

## 2024-09-16 RX ADMIN — BUPRENORPHINE AND NALOXONE 4 MG: 2; .5 FILM BUCCAL; SUBLINGUAL at 11:47

## 2024-09-16 RX ADMIN — DOXYLAMINE SUCCINATE 25 MG: 25 TABLET ORAL at 22:41

## 2024-09-16 RX ADMIN — TRAZODONE HYDROCHLORIDE 50 MG: 50 TABLET ORAL at 21:53

## 2024-09-16 RX ADMIN — CYCLOBENZAPRINE HYDROCHLORIDE 10 MG: 10 TABLET, FILM COATED ORAL at 08:20

## 2024-09-16 RX ADMIN — ENOXAPARIN SODIUM 40 MG: 40 INJECTION SUBCUTANEOUS at 08:19

## 2024-09-16 RX ADMIN — DIAZEPAM 10 MG: 10 INJECTION, SOLUTION INTRAMUSCULAR; INTRAVENOUS at 08:24

## 2024-09-16 RX ADMIN — LISINOPRIL 20 MG: 20 TABLET ORAL at 08:20

## 2024-09-16 RX ADMIN — POTASSIUM CHLORIDE 40 MEQ: 1500 TABLET, EXTENDED RELEASE ORAL at 13:11

## 2024-09-16 RX ADMIN — GABAPENTIN 300 MG: 300 CAPSULE ORAL at 21:53

## 2024-09-16 RX ADMIN — ACETAMINOPHEN 975 MG: 325 TABLET, FILM COATED ORAL at 13:11

## 2024-09-16 RX ADMIN — PANTOPRAZOLE SODIUM 40 MG: 40 INJECTION, POWDER, FOR SOLUTION INTRAVENOUS at 08:19

## 2024-09-16 RX ADMIN — CLONIDINE HYDROCHLORIDE 0.2 MG: 0.2 TABLET ORAL at 13:11

## 2024-09-16 RX ADMIN — ACETAMINOPHEN 975 MG: 325 TABLET, FILM COATED ORAL at 05:54

## 2024-09-16 RX ADMIN — GABAPENTIN 300 MG: 300 CAPSULE ORAL at 08:20

## 2024-09-16 RX ADMIN — CLONIDINE HYDROCHLORIDE 0.2 MG: 0.2 TABLET ORAL at 21:53

## 2024-09-16 RX ADMIN — CLONIDINE HYDROCHLORIDE 0.2 MG: 0.2 TABLET ORAL at 05:54

## 2024-09-16 RX ADMIN — GABAPENTIN 300 MG: 300 CAPSULE ORAL at 16:18

## 2024-09-16 RX ADMIN — CYCLOBENZAPRINE HYDROCHLORIDE 10 MG: 10 TABLET, FILM COATED ORAL at 21:53

## 2024-09-16 RX ADMIN — CYCLOBENZAPRINE HYDROCHLORIDE 10 MG: 10 TABLET, FILM COATED ORAL at 16:18

## 2024-09-16 NOTE — PROGRESS NOTES
"Progress Note - Infectious Disease   Bryant Lazcano 44 y.o. male MRN: [id number]  Unit/Bed#: [facility] 412-01 Encounter: [id number]      Impression/Plan:  SIRS. E/b tachycardia and leukocytosis. Also reported subjective fevers/chills. High initial concern for bacteremia or endocarditis in setting of #2. However, infectious work-up has remained unremarkable. Blood cultures NGTD x 72h, MRI C spine unremarkable for osteomyelitis/discitis, and CTA chest negative for PE or septic arthritis. TTE without valvular vegetation. Patient states this may have been due to a \"bad batch\" of heroin. Suspect chest pain/left clavicular pain was musculoskeletal in setting of severe rigors. Leukocytosis down-trending. Patient reports clinical improvement.   Continue to monitor closely off antibiotics  Continue to monitor temperature/vitals   If patient continues to spike fevers, would obtain repeat blood cultures (2 sets, separate sites) prior to starting any antibiotics  Continue to follow CBCD with AM labs to follow WBC     Substance use disorder. Patient reports current IVDU. Last injected heroin on 9/13 AM. This increases his risk of bacteremia and endovascular infection. HIV screening negative. Follow-up Hep C VL. Patient interested in obtaining MAT.  Appreciate toxicology recommendations     History of hepatitis C. Untreated. Follow-up hepatitis C viral load. If elevated, patient will need referral to GI for treatment in outpatient setting.   Follow-up hepatitis C viral load  Trend LFTs     T2DM. Most recent A1C 5 months ago was 5.2. Well controlled. Remains a risk factor for development of infection.  Recommend continuing with tight glycemic control    Remote history of MRSA infection/spinal infection. Reports this was ~15 years ago. No records available to confirm this. Blood cultures currently NGTD as above.     Above plan was discussed in detail with patient at bedside.   Above plan was discussed in detail with primary service " "attending, who agrees with the plan to continue to monitor closely off antibiotics for now.    Antibiotics:  none    Subjective:  Patient states \"I feel great\". Denies having any fevers, chills, sweats, shakes; no nausea, vomiting, abdominal pain, diarrhea, or dysuria; no cough, shortness of breath, or chest pain. No new symptoms. States he thinks he had a \"bad batch\" of heroin. States he has a friend that used the same batch and also ended up in the hospital. States this was a very \"scary experience\". States he is interested in recovery.     Objective:  Vitals:  Temp:  [98.3 °F (36.8 °C)-100.6 °F (38.1 °C)] 98.3 °F (36.8 °C)  HR:  [] 104  Resp:  [18] 18  BP: (125-190)/() 139/86  SpO2:  [96 %-98 %] 98 %  Temp (24hrs), Av.8 °F (37.7 °C), Min:98.3 °F (36.8 °C), Max:100.6 °F (38.1 °C)  Current: Temperature: 98.3 °F (36.8 °C)    Physical Exam:   General Appearance:  Alert, interactive, nontoxic, no acute distress.   Throat: Oropharynx moist without lesions.    Lungs:   Clear to auscultation bilaterally; no wheezes, rhonchi or rales; respirations unlabored.   Heart:  RRR; no murmur, rub or gallop.   Abdomen:   Soft, non-tender, non-distended, positive bowel sounds.     Extremities: No clubbing or cyanosis, no edema.   Skin: No new rashes, lesions, or draining wounds noted on exposed skin.     Labs, Imaging, & Other studies:   All pertinent labs and imaging studies were personally reviewed  Results from last 7 days   Lab Units 24  0648 09/15/24  0908 24  1053   WBC Thousand/uL 10.43* 14.29* 17.96*   HEMOGLOBIN g/dL 14.4 14.4 15.2   PLATELETS Thousands/uL 152 175 201     Results from last 7 days   Lab Units 24  0648 09/15/24  0919 24  1053 24  0616   POTASSIUM mmol/L 3.3*   < > 3.1* 3.6   CHLORIDE mmol/L 107   < > 102 102   CO2 mmol/L 28   < > 27 19*   BUN mg/dL 15   < > 12 8   CREATININE mg/dL 0.73   < > 0.66 0.64   EGFR ml/min/1.73sq m 112   < > 116 118   CALCIUM mg/dL 9.4   " < > 9.6 9.6   AST U/L 53*  --  30 51*   ALT U/L 61*  --  42 58*   ALK PHOS U/L 84  --  79 94    < > = values in this interval not displayed.     Results from last 7 days   Lab Units 09/13/24  0928 09/13/24  0640 09/13/24  0631   BLOOD CULTURE  No Growth at 48 hrs. No Growth at 48 hrs. No Growth at 48 hrs.     Results from last 7 days   Lab Units 09/13/24  0616   PROCALCITONIN ng/ml 0.07                     Imaging Studies:  I have personally reviewed pertinent imaging study reports and images in PACS.     9/14 MRI C spine: no evidence to suggest discitis/osteomyelitis of the C spine. No epidural paraspinal abscess. Multilevel cervical spondylosis.     9/13 CTA chest: no evidence of PE    9/13 TTE: No echocardiographic evidence of endocarditis.       Cassandra Jerome PA-C  [facility]

## 2024-09-16 NOTE — PLAN OF CARE
Problem: Knowledge Deficit  Goal: Patient/family/caregiver demonstrates understanding of disease process, treatment plan, medications, and discharge instructions  Description: Complete learning assessment and assess knowledge base.  Interventions:  - Provide teaching at level of understanding  - Provide teaching via preferred learning methods  Outcome: Progressing     Problem: PAIN - ADULT  Goal: Verbalizes/displays adequate comfort level or baseline comfort level  Description: Interventions:  - Encourage patient to monitor pain and request assistance  - Assess pain using appropriate pain scale  - Administer analgesics based on type and severity of pain and evaluate response  - Implement non-pharmacological measures as appropriate and evaluate response  - Consider cultural and social influences on pain and pain management  - Notify physician/advanced practitioner if interventions unsuccessful or patient reports new pain  Outcome: Progressing     Problem: INFECTION - ADULT  Goal: Absence or prevention of progression during hospitalization  Description: INTERVENTIONS:  - Assess and monitor for signs and symptoms of infection  - Monitor lab/diagnostic results  - Monitor all insertion sites, i.e. indwelling lines, tubes, and drains  - Monitor endotracheal if appropriate and nasal secretions for changes in amount and color  - [place] appropriate cooling/warming therapies per order  - Administer medications as ordered  - Instruct and encourage patient and family to use good hand hygiene technique  - Identify and instruct in appropriate isolation precautions for identified infection/condition  Outcome: Progressing

## 2024-09-16 NOTE — CASE MANAGEMENT
Case Management Discharge Planning Note    Patient name Bryant Lazcano  Location [facility] 412/PPHP 412-01 MRN [id number]  : 1980 Date 2024       Current Admission Date: 2024  Current Admission Diagnosis:IV drug user   Patient Active Problem List    Diagnosis Date Noted Date Diagnosed    Type 2 diabetes mellitus without complication, without long-term current use of insulin (HCC) 2023     Chronic hepatitis C without hepatic coma (HCC) 2022     IV drug user 2022       LOS (days): 3  Geometric Mean LOS (GMLOS) (days): 2.7  Days to GMLOS:-0.3     OBJECTIVE:  Risk of Unplanned Readmission Score: 10.32         Current admission status: Inpatient   Preferred Pharmacy:   [id number] [place], [place] - [address]  [address] [address]  Phone: [phone] Fax: [phone]    [id number] [place], [place] - [address]  [address] [address]  Phone: [phone] Fax: [phone]    Primary Care Provider: No primary care provider on file.    Primary Insurance: [employer]  Secondary Insurance:     DISCHARGE DETAILS:     TC to HOST @ [phone] and spoke with Catrina for updates. ALONZO was informed by TRISTEN David that pt is likely medically stable tmr for d/c. He will work with toxicology to ensure safe DCP. Catrina stated that she would reach out tmr to CM to make sure that pt is medically stable. If so, she will f/u with pt OP since he is only interested in OP resources at this time. If pt were to be placed on suboxone OP, they can set pt up with their clinic as well. CM will f/u with HOST tmr.

## 2024-09-16 NOTE — ASSESSMENT & PLAN NOTE
Patient presenting with subjective fevers chills nausea chest discomfort diaphoresis, admits to ongoing IV drug abuse last use yesterday  Patient reports he was on long-term antibiotics for for infection in his spine  My primary suspicion is for a bacteremia, will cover with vancomycin , cefepime added for gram negative coverage  Check echocardiogram -reviewed without any vegetations  Check CT chest due to ongoing chest discomfort to sternal area also MRI of cervical spine-imaging is unremarkable    9/15/24: Patient's blood cultures are negative at 24 hours, would expect growth if he had a true bacteremia at this point but will monitor until tomorrow for clearance at 48 hours.  Suspicion that he may have gotten a bad batch of heroin, he reports his friend had similar symptoms after using the same substance  Will monitor off antibiotics  Will consult toxicology for initiation of MAT    9/16/24: Patient continues to have negative blood cultures, cleared for discharge pending initiation of MAT and establishment of outpatient follow-up  he is tolerating Suboxone will continue on 8 mg daily

## 2024-09-17 VITALS
BODY MASS INDEX: 32.57 KG/M2 | HEIGHT: 70 IN | RESPIRATION RATE: 18 BRPM | OXYGEN SATURATION: 98 % | WEIGHT: 227.51 LBS | TEMPERATURE: 98.1 F | DIASTOLIC BLOOD PRESSURE: 74 MMHG | SYSTOLIC BLOOD PRESSURE: 121 MMHG | HEART RATE: 98 BPM

## 2024-09-17 PROBLEM — R65.10 SIRS (SYSTEMIC INFLAMMATORY RESPONSE SYNDROME) (HCC): Status: ACTIVE | Noted: 2024-09-17

## 2024-09-17 LAB
ALBUMIN SERPL BCG-MCNC: 4 G/DL (ref 3.5–5)
ALP SERPL-CCNC: 86 U/L (ref 34–104)
ALT SERPL W P-5'-P-CCNC: 91 U/L (ref 7–52)
ANION GAP SERPL CALCULATED.3IONS-SCNC: 9 MMOL/L (ref 4–13)
AST SERPL W P-5'-P-CCNC: 86 U/L (ref 13–39)
BASOPHILS # BLD AUTO: 0.08 THOUSANDS/ΜL (ref 0–0.1)
BASOPHILS NFR BLD AUTO: 1 % (ref 0–1)
BILIRUB SERPL-MCNC: 0.63 MG/DL (ref 0.2–1)
BUN SERPL-MCNC: 16 MG/DL (ref 5–25)
CALCIUM SERPL-MCNC: 9.3 MG/DL (ref 8.4–10.2)
CHLORIDE SERPL-SCNC: 108 MMOL/L (ref 96–108)
CO2 SERPL-SCNC: 22 MMOL/L (ref 21–32)
CREAT SERPL-MCNC: 0.53 MG/DL (ref 0.6–1.3)
EOSINOPHIL # BLD AUTO: 0.52 THOUSAND/ΜL (ref 0–0.61)
EOSINOPHIL NFR BLD AUTO: 6 % (ref 0–6)
ERYTHROCYTE [DISTWIDTH] IN BLOOD BY AUTOMATED COUNT: 12 % (ref 11.6–15.1)
GFR SERPL CREATININE-BSD FRML MDRD: 127 ML/MIN/1.73SQ M
GLUCOSE SERPL-MCNC: 126 MG/DL (ref 65–140)
HCT VFR BLD AUTO: 41.3 % (ref 36.5–49.3)
HGB BLD-MCNC: 14.4 G/DL (ref 12–17)
IMM GRANULOCYTES # BLD AUTO: 0.03 THOUSAND/UL (ref 0–0.2)
IMM GRANULOCYTES NFR BLD AUTO: 0 % (ref 0–2)
INR PPP: 1.01 (ref 0.85–1.19)
LYMPHOCYTES # BLD AUTO: 3.72 THOUSANDS/ΜL (ref 0.6–4.47)
LYMPHOCYTES NFR BLD AUTO: 40 % (ref 14–44)
MCH RBC QN AUTO: 30.6 PG (ref 26.8–34.3)
MCHC RBC AUTO-ENTMCNC: 34.9 G/DL (ref 31.4–37.4)
MCV RBC AUTO: 88 FL (ref 82–98)
MONOCYTES # BLD AUTO: 0.57 THOUSAND/ΜL (ref 0.17–1.22)
MONOCYTES NFR BLD AUTO: 6 % (ref 4–12)
NEUTROPHILS # BLD AUTO: 4.39 THOUSANDS/ΜL (ref 1.85–7.62)
NEUTS SEG NFR BLD AUTO: 47 % (ref 43–75)
NRBC BLD AUTO-RTO: 0 /100 WBCS
PLATELET # BLD AUTO: 168 THOUSANDS/UL (ref 149–390)
PMV BLD AUTO: 11.2 FL (ref 8.9–12.7)
POTASSIUM SERPL-SCNC: 3.5 MMOL/L (ref 3.5–5.3)
PROT SERPL-MCNC: 7.4 G/DL (ref 6.4–8.4)
PROTHROMBIN TIME: 13.6 SECONDS (ref 12.3–15)
RBC # BLD AUTO: 4.7 MILLION/UL (ref 3.88–5.62)
SODIUM SERPL-SCNC: 139 MMOL/L (ref 135–147)
WBC # BLD AUTO: 9.31 THOUSAND/UL (ref 4.31–10.16)

## 2024-09-17 PROCEDURE — 85610 PROTHROMBIN TIME: CPT | Performed by: INTERNAL MEDICINE

## 2024-09-17 PROCEDURE — 99232 SBSQ HOSP IP/OBS MODERATE 35: CPT | Performed by: INTERNAL MEDICINE

## 2024-09-17 PROCEDURE — 85025 COMPLETE CBC W/AUTO DIFF WBC: CPT | Performed by: INTERNAL MEDICINE

## 2024-09-17 PROCEDURE — 99239 HOSP IP/OBS DSCHRG MGMT >30: CPT | Performed by: PHYSICIAN ASSISTANT

## 2024-09-17 PROCEDURE — 80053 COMPREHEN METABOLIC PANEL: CPT | Performed by: INTERNAL MEDICINE

## 2024-09-17 RX ORDER — PANTOPRAZOLE SODIUM 40 MG/1
40 TABLET, DELAYED RELEASE ORAL
Status: DISCONTINUED | OUTPATIENT
Start: 2024-09-17 | End: 2024-09-17 | Stop reason: HOSPADM

## 2024-09-17 RX ADMIN — CYCLOBENZAPRINE HYDROCHLORIDE 10 MG: 10 TABLET, FILM COATED ORAL at 08:07

## 2024-09-17 RX ADMIN — HYDRALAZINE HYDROCHLORIDE 25 MG: 25 TABLET ORAL at 00:01

## 2024-09-17 RX ADMIN — GABAPENTIN 300 MG: 300 CAPSULE ORAL at 08:07

## 2024-09-17 RX ADMIN — CLONIDINE HYDROCHLORIDE 0.2 MG: 0.2 TABLET ORAL at 13:38

## 2024-09-17 RX ADMIN — HYDRALAZINE HYDROCHLORIDE 25 MG: 25 TABLET ORAL at 13:38

## 2024-09-17 RX ADMIN — LISINOPRIL 20 MG: 20 TABLET ORAL at 08:07

## 2024-09-17 RX ADMIN — ACETAMINOPHEN 975 MG: 325 TABLET, FILM COATED ORAL at 05:18

## 2024-09-17 RX ADMIN — NICOTINE 1 PATCH: 14 PATCH, EXTENDED RELEASE TRANSDERMAL at 08:07

## 2024-09-17 RX ADMIN — ACETAMINOPHEN 975 MG: 325 TABLET, FILM COATED ORAL at 13:38

## 2024-09-17 RX ADMIN — PANTOPRAZOLE SODIUM 40 MG: 40 TABLET, DELAYED RELEASE ORAL at 08:07

## 2024-09-17 RX ADMIN — BUPRENORPHINE AND NALOXONE 8 MG: 8; 2 FILM BUCCAL; SUBLINGUAL at 08:07

## 2024-09-17 RX ADMIN — CLONIDINE HYDROCHLORIDE 0.2 MG: 0.2 TABLET ORAL at 05:18

## 2024-09-17 RX ADMIN — ENOXAPARIN SODIUM 40 MG: 40 INJECTION SUBCUTANEOUS at 08:07

## 2024-09-17 RX ADMIN — DIAZEPAM 10 MG: 10 INJECTION, SOLUTION INTRAMUSCULAR; INTRAVENOUS at 05:18

## 2024-09-17 NOTE — PROGRESS NOTES
Progress Note - Infectious Disease   Bryant Lazcano 44 y.o. male MRN: [id number]  Unit/Bed#: [facility] 412-01 Encounter: [id number]      Impression/Plan:    SIRS response; leukocytosis, tachycardia  -This may have all been reactive to bad batch of heroin. Infectious workup negative including blood cultures, COVID/Flu/RSV PCR, CT of chest, MRI-C-spine. Procalcitonin normal. UA not suggestive of UTI. No obvious skin/soft tissue infection on exam. He is currently afebrile and hemodynamically stable. WBC now normalized off antibiotic and he overall feels better.  -Continue to monitor off antibiotic  -Monitor for fever  -If patient has further fever would repeat blood cultures X 2, otherwise no further ID workup required at this time     2. Rigors with subjective fevers:  -In setting of IV use, consider bacteremia. However blood cultures negative, TTE without evidence of vegetation. May have been reactive symptoms to bad batch of heroin as per patient. Additional infectious workup negative as above. He has remained clinically stable, afebrile and with normalized WBC.  -Continue to monitor off antibiotic  -Trend fever curve     3. Chest pain and Left clavicular pain:  -Per patient pain is acute and he had significant tenderness on exam of clavicle. He denies injection into that area. CT imaging however negative for any signs of soft tissue or SC joint infection, pneumonia or septic emboli. Pain now improved.   -Monitor for recurrent symptoms     4. Cervical neck pain  -Patient has chronic neck pain. MRI C-spine negative for signs of infection, patient was not bacteremic. Overall stable.  -Continue to monitor symptoms     5. Active injection use:  -Risk factor for infection, including bacteremia. HIV screen negative. Counseled on cessation and infection risk.     6. Transaminitis, history of untreated Hep C:  -AST 51, ALT 58 on arrival.  Could be somewhat chronic based on prior LFTs and history of untreated Hepatitis C.    -Follow up hepatitis C viral load; if positive can refer to GI for outpatient treatment if patient interested  -Trend LFTs     7. Remote history of MRSA infection/spinal infection:  -Per patient. No records available to confirm or prior cultures. Blood cultures this admission negative.    Plan and recommendations were discussed with primary team.  They agree with plan to monitor off antibiotic.  ID will now sign off, call back if needed.    Antibiotics:  Days off: 2    24 Hour Events:  Afebrile, WBC normal this AM.    Subjective:  Patient has no fever, chills, sweats; no nausea, vomiting, diarrhea; no cough, shortness of breath; no pain. No new symptoms.    Objective:  Vitals:  Temp:  [98.1 °F (36.7 °C)-98.3 °F (36.8 °C)] 98.1 °F (36.7 °C)  HR:  [101-104] 101  Resp:  [18] 18  BP: (116-158)/(84-89) 116/84  SpO2:  [98 %] 98 %  Temp (24hrs), Av.2 °F (36.8 °C), Min:98.1 °F (36.7 °C), Max:98.3 °F (36.8 °C)  Current: Temperature: 98.1 °F (36.7 °C)    Physical Exam:   General Appearance:  Alert, interactive, nontoxic, no acute distress.   Throat: Oropharynx moist without lesions.    Lungs:   Respirations unlabored   Heart:  RRR   Abdomen:   Soft, non-tender, non-distended, positive bowel sounds.     Extremities: No clubbing, cyanosis or edema   Skin: No new rashes or lesions.       Labs:   All pertinent labs and imaging studies were personally reviewed  Results from last 7 days   Lab Units 24  0524  0648 09/15/24  0908   WBC Thousand/uL 9.31 10.43* 14.29*   HEMOGLOBIN g/dL 14.4 14.4 14.4   PLATELETS Thousands/uL 168 152 175     Results from last 7 days   Lab Units 24  0529 24  0648 09/15/24  0919 24  1053   SODIUM mmol/L 139 144 139 139   POTASSIUM mmol/L 3.5 3.3* 3.4* 3.1*   CHLORIDE mmol/L 108 107 106 102   CO2 mmol/L 22 28 25 27   BUN mg/dL 16 15 12 12   CREATININE mg/dL 0.53* 0.73 0.58* 0.66   EGFR ml/min/1.73sq m 127 112 123 116   CALCIUM mg/dL 9.3 9.4 9.4 9.6   AST U/L 86* 53*   --  30   ALT U/L 91* 61*  --  42   ALK PHOS U/L 86 84  --  79     Results from last 7 days   Lab Units 09/13/24  0616   PROCALCITONIN ng/ml 0.07                   Micro:  Results from last 7 days   Lab Units 09/13/24  0928 09/13/24  0640 09/13/24  0631   BLOOD CULTURE  No Growth at 72 hrs. No Growth at 72 hrs. No Growth at 72 hrs.       Imaging:          Adan Fields MD  [facility]

## 2024-09-17 NOTE — CASE MANAGEMENT
Case Management Discharge Planning Note    Patient name Bryant Lazcano  Location [facility] 412/[facility] 412-01 MRN [id number]  : 1980 Date 2024       Current Admission Date: 2024  Current Admission Diagnosis:IV drug user   Patient Active Problem List    Diagnosis Date Noted Date Diagnosed    Type 2 diabetes mellitus without complication, without long-term current use of insulin (HCC) 2023     Chronic hepatitis C without hepatic coma (HCC) 2022     IV drug user 2022       LOS (days): 4  Geometric Mean LOS (GMLOS) (days): 2.7  Days to GMLOS:-1.3     OBJECTIVE:  Risk of Unplanned Readmission Score: 11.09         Current admission status: Inpatient   Preferred Pharmacy:   [id number]  [place] - [address]  [address] [place] [address]  Phone: [phone] Fax: [phone]    [id number]  [place]  [address] [facility]  [address] [place] [address]  Phone: [phone] Fax: [phone]    [id number]  [place] [address] [address]  Phone: [phone] Fax: [phone]    Primary Care Provider: No primary care provider on file.    Primary Insurance: [employer]  Secondary Insurance:     DISCHARGE DETAILS:     CM informed by TRISTEN DANIELS that pt is medically stable to be d/c'd. CM called HOST @ [phone] to coordinate d/c. Left  for call back.

## 2024-09-17 NOTE — PLAN OF CARE
Problem: INFECTION - ADULT  Goal: Absence or prevention of progression during hospitalization  Description: INTERVENTIONS:  - Assess and monitor for signs and symptoms of infection  - Monitor lab/diagnostic results  - Monitor all insertion sites, i.e. indwelling lines, tubes, and drains  - Monitor endotracheal if appropriate and nasal secretions for changes in amount and color  - [place] appropriate cooling/warming therapies per order  - Administer medications as ordered  - Instruct and encourage patient and family to use good hand hygiene technique  - Identify and instruct in appropriate isolation precautions for identified infection/condition  Outcome: Progressing

## 2024-09-17 NOTE — ASSESSMENT & PLAN NOTE
Pt with active IVDA, admits to heroin use   Toxicology consulted for initiation of MAT  Patient is currently doing well on Suboxone 8 mg daily, patient will be discharged on Suboxone therapy with close follow-up with host for continued management

## 2024-09-17 NOTE — ASSESSMENT & PLAN NOTE
"POA with fevers, leukocytosis, tachypnea  Infectious disease consulted, appreciate input  Infectious workup negative including blood cultures, COVID flu RSV, CT chest, MRI cervical spine.  Negative procalcitonin.  UA is not suggestive of UTI.  No obvious skin or soft tissue infection on exam.  Monitoring off antibiotics.  Leukocytosis have resolved.  Now afebrile  Suspect reaction to \"bad batch\" of heroin.  Now initiated on MAT -- see below   "

## 2024-09-17 NOTE — DISCHARGE SUMMARY
"Discharge Summary - Hospitalist   Name: Bryant Lazcano 44 y.o. male I MRN: [id number]  Unit/Bed#: [facility] 412-01 I Date of Admission: 9/13/2024   Date of Service: 9/17/2024 I Hospital Day: 4     Assessment & Plan  SIRS (systemic inflammatory response syndrome) (HCC)  POA with fevers, leukocytosis, tachypnea  Infectious disease consulted, appreciate input  Infectious workup negative including blood cultures, COVID flu RSV, CT chest, MRI cervical spine.  Negative procalcitonin.  UA is not suggestive of UTI.  No obvious skin or soft tissue infection on exam.  Monitoring off antibiotics.  Leukocytosis have resolved.  Now afebrile  Suspect reaction to \"bad batch\" of heroin.  Now initiated on MAT -- see below   IV drug user  Pt with active IVDA, admits to heroin use   Toxicology consulted for initiation of MAT  Patient is currently doing well on Suboxone 8 mg daily, patient will be discharged on Suboxone therapy with close follow-up with host for continued management  Chronic hepatitis C without hepatic coma (HCC)  Can follow these labs outpatient with outpatient follow-up with GI pending results     Medical Problems       Resolved Problems  Date Reviewed: 9/14/2024   None       Discharging Physician / Practitioner: Qian Ramsey PA-C  PCP: No primary care provider on file.  Admission Date:   Admission Orders (From admission, onward)       Ordered        09/13/24 1205  INPATIENT ADMISSION  Once                          Discharge Date: 09/17/24    Consultations During Hospital Stay:  Infectious disease    Procedures Performed:   None    Significant Findings / Test Results:   As above    Incidental Findings:   None    Test Results Pending at Discharge (will require follow up):   None     Outpatient Tests Requested:  None    Complications:  none    Reason for Admission: SIRS    Hospital Course:   Bryant Lazcano is a 44 y.o. male patient who originally presented to the hospital on 9/13/2024 due to fevers, chills.  He reported " "to active IVDA.  He met none SIRS criteria with fever tach, tachycardia, leukocytosis.  Infectious workup as mentioned above is all negative.  He was seen by infectious disease.  He will systemic inflammatory response syndrome secondary to drug use and likely \"bad batch\" of heroin.  Patient has been started on MAT with Suboxone, he is tolerating this well.  He will be followed closely as an outpatient for continuation of this treatment.  He is medically stable for discharge.    Please see above list of diagnoses and related plan for additional information.     Condition at Discharge: good    Discharge Day Visit / Exam:   Subjective:  Pt no acute complaints, feels great today   Vitals: Blood Pressure: 121/74 (09/17/24 0801)  Pulse: 98 (09/17/24 0801)  Temperature: 98.1 °F (36.7 °C) (09/17/24 0801)  Temp Source: Oral (09/17/24 0801)  Respirations: 18 (09/17/24 0801)  Height: 5' 10\" (177.8 cm) (09/13/24 1400)  Weight - Scale: 103 kg (227 lb 8.2 oz) (09/17/24 0600)  SpO2: 98 % (09/17/24 0801)  Exam:   Physical Exam  Vitals reviewed.   Constitutional:       General: He is not in acute distress.     Appearance: He is not toxic-appearing.   HENT:      Head: Normocephalic and atraumatic.   Eyes:      Extraocular Movements: Extraocular movements intact.   Cardiovascular:      Rate and Rhythm: Normal rate and regular rhythm.   Pulmonary:      Effort: Pulmonary effort is normal. No respiratory distress.   Abdominal:      General: Bowel sounds are normal. There is no distension.      Palpations: Abdomen is soft.   Musculoskeletal:         General: Normal range of motion.   Neurological:      General: No focal deficit present.      Mental Status: He is alert and oriented to person, place, and time.   Psychiatric:         Mood and Affect: Mood normal.         Behavior: Behavior normal.         Thought Content: Thought content normal.          Discussion with Family: Patient declined call to .     Discharge " instructions/Information to patient and family:   See after visit summary for information provided to patient and family.      Provisions for Follow-Up Care:  See after visit summary for information related to follow-up care and any pertinent home health orders.      Mobility at time of Discharge:   Basic Mobility Inpatient Raw Score: 24  JH-HLM Goal: 8: Walk 250 feet or more  JH-HLM Achieved: 7: Walk 25 feet or more  HLM Goal NOT achieved. Continue to encourage mobility in post discharge setting.     Disposition:   Home    Planned Readmission: no    Discharge Medications:  See after visit summary for reconciled discharge medications provided to patient and/or family.      Administrative Statements   Discharge Statement:  I have spent a total time of 35 minutes in caring for this patient on the day of the visit/encounter.     **Please Note: This note may have been constructed using a voice recognition system**

## 2024-09-18 LAB
BACTERIA BLD CULT: NORMAL
HCV RNA SERPL NAA+PROBE-ACNC: ABNORMAL IU/ML
HCV RNA SERPL NAA+PROBE-ACNC: DETECTED K[IU]/ML

## 2024-09-18 NOTE — UTILIZATION REVIEW
NOTIFICATION OF ADMISSION DISCHARGE   This is a Notification of Discharge from [facility]. Please be advised that this patient has been discharge from our facility. Below you will find the admission and discharge date and time including the patient’s disposition.   UTILIZATION REVIEW CONTACT:  Antolin Tapia  Utilization   Network Utilization Review Department  Phone: [phone] x carefully listen to the prompts. All voicemails are confidential.  Email: [email or web address]     ADMISSION INFORMATION  PRESENTATION DATE: 9/13/2024  5:37 AM  OBERVATION ADMISSION DATE: N/A  INPATIENT ADMISSION DATE: 9/13/24 12:05 PM   DISCHARGE DATE: 9/17/2024  2:42 PM   DISPOSITION:Home/Self Care    Network Utilization Review Department  ATTENTION: Please call with any questions or concerns to [phone] and carefully listen to the prompts so that you are directed to the right person. All voicemails are confidential.   For Discharge needs, contact Care Management DC Support Team at [phone] opt. 2  Send all requests for admission clinical reviews, approved or denied determinations and any other requests to dedicated fax number below belonging to the campus where the patient is receiving treatment. List of dedicated fax numbers for the Facilities:  FACILITY NAME UR FAX NUMBER   ADMISSION DENIALS (Administrative/Medical Necessity) [phone]   DISCHARGE SUPPORT TEAM ([facility]) [phone]   PARENT CHILD HEALTH (Maternity/NICU/Pediatrics) [phone]   [facility] [phone]   [facility] [phone]   [facility] [phone]   [facility] [phone]   [facility] [phone]   [facility] [phone]   [facility] [phone]   [facility] [phone]   [facility]  [facility] [phone]   [facility] [phone]   [facility] [phone]   [facility] [phone]

## 2024-09-19 ENCOUNTER — TELEPHONE (OUTPATIENT)
Dept: OTHER | Age: 44
End: 2024-09-19

## 2024-09-19 NOTE — TELEPHONE ENCOUNTER
Received a fax from [facility], for a new pt referral to the [facility] due to Bryant's drug use. Called and spoke to Bryant regarding referral. [facility] services explained to Bryant and he is interested in our services. Bryant last used heroin last week and has been prescribed a 30-day supply of Suboxone from [facility].    Bryant has no hearing impairment, no language barrier, nor any psychiatric diagnoses that he is aware of. Bryant feels safe, has no current SI/HI, nor any current community treatment team. Insurance and residence reviewed. Scheduled appt with Marisela LISA CM, on 9/24/24 at 2 pm. Will scan referral to [facility].    For your review.